# Patient Record
Sex: MALE | Race: ASIAN | Employment: FULL TIME | URBAN - METROPOLITAN AREA
[De-identification: names, ages, dates, MRNs, and addresses within clinical notes are randomized per-mention and may not be internally consistent; named-entity substitution may affect disease eponyms.]

---

## 2018-11-09 ENCOUNTER — OFFICE VISIT (OUTPATIENT)
Dept: FAMILY MEDICINE CLINIC | Age: 53
End: 2018-11-09
Payer: COMMERCIAL

## 2018-11-09 VITALS
OXYGEN SATURATION: 99 % | WEIGHT: 170 LBS | SYSTOLIC BLOOD PRESSURE: 90 MMHG | HEIGHT: 65 IN | BODY MASS INDEX: 28.32 KG/M2 | DIASTOLIC BLOOD PRESSURE: 60 MMHG | HEART RATE: 63 BPM

## 2018-11-09 DIAGNOSIS — Z00.00 ANNUAL PHYSICAL EXAM: Primary | ICD-10-CM

## 2018-11-09 DIAGNOSIS — Z23 NEED FOR INFLUENZA VACCINATION: ICD-10-CM

## 2018-11-09 DIAGNOSIS — Z00.00 ANNUAL PHYSICAL EXAM: ICD-10-CM

## 2018-11-09 DIAGNOSIS — Z12.11 COLON CANCER SCREENING: ICD-10-CM

## 2018-11-09 DIAGNOSIS — H61.22 IMPACTED CERUMEN, LEFT EAR: ICD-10-CM

## 2018-11-09 DIAGNOSIS — R07.89 CHEST PRESSURE: ICD-10-CM

## 2018-11-09 LAB
A/G RATIO: 1.8 (ref 1.1–2.2)
ALBUMIN SERPL-MCNC: 4.4 G/DL (ref 3.4–5)
ALP BLD-CCNC: 82 U/L (ref 40–129)
ALT SERPL-CCNC: 23 U/L (ref 10–40)
ANION GAP SERPL CALCULATED.3IONS-SCNC: 8 MMOL/L (ref 3–16)
AST SERPL-CCNC: 20 U/L (ref 15–37)
BASOPHILS ABSOLUTE: 0.1 K/UL (ref 0–0.2)
BASOPHILS RELATIVE PERCENT: 0.9 %
BILIRUB SERPL-MCNC: 0.7 MG/DL (ref 0–1)
BUN BLDV-MCNC: 9 MG/DL (ref 7–20)
CALCIUM SERPL-MCNC: 9.8 MG/DL (ref 8.3–10.6)
CHLORIDE BLD-SCNC: 102 MMOL/L (ref 99–110)
CHOLESTEROL, FASTING: 168 MG/DL (ref 0–199)
CO2: 29 MMOL/L (ref 21–32)
CREAT SERPL-MCNC: 1 MG/DL (ref 0.9–1.3)
EOSINOPHILS ABSOLUTE: 0.3 K/UL (ref 0–0.6)
EOSINOPHILS RELATIVE PERCENT: 4.8 %
GFR AFRICAN AMERICAN: >60
GFR NON-AFRICAN AMERICAN: >60
GLOBULIN: 2.4 G/DL
GLUCOSE BLD-MCNC: 85 MG/DL (ref 70–99)
HCT VFR BLD CALC: 48.9 % (ref 40.5–52.5)
HDLC SERPL-MCNC: 33 MG/DL (ref 40–60)
HEMOGLOBIN: 16.5 G/DL (ref 13.5–17.5)
LDL CHOLESTEROL CALCULATED: 101 MG/DL
LYMPHOCYTES ABSOLUTE: 2.4 K/UL (ref 1–5.1)
LYMPHOCYTES RELATIVE PERCENT: 39.9 %
MCH RBC QN AUTO: 30 PG (ref 26–34)
MCHC RBC AUTO-ENTMCNC: 33.7 G/DL (ref 31–36)
MCV RBC AUTO: 88.9 FL (ref 80–100)
MONOCYTES ABSOLUTE: 0.5 K/UL (ref 0–1.3)
MONOCYTES RELATIVE PERCENT: 8 %
NEUTROPHILS ABSOLUTE: 2.8 K/UL (ref 1.7–7.7)
NEUTROPHILS RELATIVE PERCENT: 46.4 %
PDW BLD-RTO: 13.2 % (ref 12.4–15.4)
PLATELET # BLD: 206 K/UL (ref 135–450)
PMV BLD AUTO: 10.4 FL (ref 5–10.5)
POTASSIUM SERPL-SCNC: 5 MMOL/L (ref 3.5–5.1)
RBC # BLD: 5.5 M/UL (ref 4.2–5.9)
SODIUM BLD-SCNC: 139 MMOL/L (ref 136–145)
TOTAL PROTEIN: 6.8 G/DL (ref 6.4–8.2)
TRIGLYCERIDE, FASTING: 169 MG/DL (ref 0–150)
TSH REFLEX: 2.46 UIU/ML (ref 0.27–4.2)
VLDLC SERPL CALC-MCNC: 34 MG/DL
WBC # BLD: 6 K/UL (ref 4–11)

## 2018-11-09 PROCEDURE — 93000 ELECTROCARDIOGRAM COMPLETE: CPT | Performed by: FAMILY MEDICINE

## 2018-11-09 PROCEDURE — 99386 PREV VISIT NEW AGE 40-64: CPT | Performed by: FAMILY MEDICINE

## 2018-11-09 PROCEDURE — 69209 REMOVE IMPACTED EAR WAX UNI: CPT | Performed by: FAMILY MEDICINE

## 2018-11-09 PROCEDURE — 90471 IMMUNIZATION ADMIN: CPT | Performed by: FAMILY MEDICINE

## 2018-11-09 PROCEDURE — 90682 RIV4 VACC RECOMBINANT DNA IM: CPT | Performed by: FAMILY MEDICINE

## 2018-11-09 PROCEDURE — 93017 CV STRESS TEST TRACING ONLY: CPT | Performed by: FAMILY MEDICINE

## 2018-11-09 ASSESSMENT — PATIENT HEALTH QUESTIONNAIRE - PHQ9
SUM OF ALL RESPONSES TO PHQ QUESTIONS 1-9: 0
2. FEELING DOWN, DEPRESSED OR HOPELESS: 0
1. LITTLE INTEREST OR PLEASURE IN DOING THINGS: 0
SUM OF ALL RESPONSES TO PHQ9 QUESTIONS 1 & 2: 0
SUM OF ALL RESPONSES TO PHQ QUESTIONS 1-9: 0

## 2018-11-09 NOTE — PROGRESS NOTES
[address] Note    Date: 11/9/2018                                               Subjective/Objective:     Chief Complaint   Patient presents with    Annual Exam      wants ECG and stress test to be done. .. Been fasting       HPI   Pt here to establish care. Last tdap 2017. Wants flu shot. Has never had a c-scope. Pt has complaint of L sided chest pressure. Lasts 5-10 minutes. Started about a year ago. Occurs about 5-6 times per month. No SOB. No dizziness. No vomiting. There are no active problems to display for this patient. History reviewed. No pertinent past medical history. Current Outpatient Prescriptions   Medication Sig Dispense Refill    carbamide peroxide (AURO EARDROPS) 6.5 % otic solution Place 5 drops in ear(s) 2 times daily 14.8 mL 0     No current facility-administered medications for this visit. No Known Allergies    Review of Systems   No rash, no bruise, no HA, no vision change, no ankle swelling, no hearing problems, no LAD      Vitals:  BP 90/60   Pulse 63   Ht 5' 5\" (1.651 m)   Wt 170 lb (77.1 kg)   SpO2 99%   BMI 28.29 kg/m²     Physical Exam   General:  Well-appearing, NAD, alert, non-toxic  HEENT:  Normocephalic, atraumatic. Pupils equal and round. Right TM normal.  +Left TM obscured with dried cerumen. Moist mucous membranes. Normal dentition  NECK:  Supple, normal range of motion, no LAD, no meningeal signs, no JVD, nontender  CHEST/LUNGS: CTAB, no crackles, no wheeze, no rhonchi. Symmetric rise  CARDIOVASCULAR: RRR,  no murmur, no rub  ABDOMEN: Soft, non-tender, non-distended. No masses  EXTREMETIES: Normal movement of all extremities. No edema. No joint swelling.   SKIN:  No rash, no cellulitis, no bruising, no petechiae/purpura/vesicles/pustules/abscess  PSYCH:  A+O x 3; normal affect  NEURO:  GCS 15, CN2-12 grossly intact, no focal motor/sensory deficits, no cerebellar deficits, normal gait, normal speech      Assessment/Plan

## 2018-11-10 LAB
ESTIMATED AVERAGE GLUCOSE: 93.9 MG/DL
HBA1C MFR BLD: 4.9 %

## 2019-02-01 ENCOUNTER — ANESTHESIA EVENT (OUTPATIENT)
Dept: ENDOSCOPY | Age: 54
End: 2019-02-01
Payer: COMMERCIAL

## 2019-02-08 ENCOUNTER — HOSPITAL ENCOUNTER (OUTPATIENT)
Age: 54
Setting detail: OUTPATIENT SURGERY
Discharge: HOME | End: 2019-02-08
Attending: INTERNAL MEDICINE | Admitting: INTERNAL MEDICINE
Payer: COMMERCIAL

## 2019-02-08 ENCOUNTER — ANESTHESIA (OUTPATIENT)
Dept: ENDOSCOPY | Age: 54
End: 2019-02-08
Payer: COMMERCIAL

## 2019-02-08 VITALS
SYSTOLIC BLOOD PRESSURE: 117 MMHG | HEIGHT: 65 IN | DIASTOLIC BLOOD PRESSURE: 85 MMHG | OXYGEN SATURATION: 100 % | TEMPERATURE: 97.8 F | WEIGHT: 168 LBS | HEART RATE: 66 BPM | BODY MASS INDEX: 27.99 KG/M2 | RESPIRATION RATE: 18 BRPM

## 2019-02-08 VITALS — DIASTOLIC BLOOD PRESSURE: 66 MMHG | SYSTOLIC BLOOD PRESSURE: 95 MMHG | OXYGEN SATURATION: 98 %

## 2019-02-08 PROCEDURE — 3700000001 HC ADD 15 MINUTES (ANESTHESIA): Performed by: INTERNAL MEDICINE

## 2019-02-08 PROCEDURE — 7100000010 HC PHASE II RECOVERY - FIRST 15 MIN: Performed by: INTERNAL MEDICINE

## 2019-02-08 PROCEDURE — 3609027000 HC COLONOSCOPY: Performed by: INTERNAL MEDICINE

## 2019-02-08 PROCEDURE — 2500000003 HC RX 250 WO HCPCS: Performed by: NURSE ANESTHETIST, CERTIFIED REGISTERED

## 2019-02-08 PROCEDURE — 6360000002 HC RX W HCPCS: Performed by: NURSE ANESTHETIST, CERTIFIED REGISTERED

## 2019-02-08 PROCEDURE — 6370000000 HC RX 637 (ALT 250 FOR IP): Performed by: INTERNAL MEDICINE

## 2019-02-08 PROCEDURE — 3700000000 HC ANESTHESIA ATTENDED CARE: Performed by: INTERNAL MEDICINE

## 2019-02-08 PROCEDURE — 2500000003 HC RX 250 WO HCPCS: Performed by: INTERNAL MEDICINE

## 2019-02-08 PROCEDURE — 2580000003 HC RX 258: Performed by: ANESTHESIOLOGY

## 2019-02-08 PROCEDURE — 7100000011 HC PHASE II RECOVERY - ADDTL 15 MIN: Performed by: INTERNAL MEDICINE

## 2019-02-08 PROCEDURE — 2709999900 HC NON-CHARGEABLE SUPPLY: Performed by: INTERNAL MEDICINE

## 2019-02-08 RX ORDER — SODIUM CHLORIDE 9 MG/ML
INJECTION, SOLUTION INTRAVENOUS CONTINUOUS
Status: DISCONTINUED | OUTPATIENT
Start: 2019-02-08 | End: 2019-02-08 | Stop reason: HOSPADM

## 2019-02-08 RX ORDER — LIDOCAINE HYDROCHLORIDE 20 MG/ML
INJECTION, SOLUTION INFILTRATION; PERINEURAL PRN
Status: DISCONTINUED | OUTPATIENT
Start: 2019-02-08 | End: 2019-02-08 | Stop reason: SDUPTHER

## 2019-02-08 RX ORDER — SODIUM CHLORIDE 0.9 % (FLUSH) 0.9 %
10 SYRINGE (ML) INJECTION EVERY 12 HOURS SCHEDULED
Status: DISCONTINUED | OUTPATIENT
Start: 2019-02-08 | End: 2019-02-08 | Stop reason: HOSPADM

## 2019-02-08 RX ORDER — LIDOCAINE HYDROCHLORIDE 10 MG/ML
1 INJECTION, SOLUTION EPIDURAL; INFILTRATION; INTRACAUDAL; PERINEURAL
Status: DISCONTINUED | OUTPATIENT
Start: 2019-02-08 | End: 2019-02-08 | Stop reason: HOSPADM

## 2019-02-08 RX ORDER — PROPOFOL 10 MG/ML
INJECTION, EMULSION INTRAVENOUS PRN
Status: DISCONTINUED | OUTPATIENT
Start: 2019-02-08 | End: 2019-02-08 | Stop reason: SDUPTHER

## 2019-02-08 RX ORDER — SODIUM CHLORIDE 0.9 % (FLUSH) 0.9 %
10 SYRINGE (ML) INJECTION PRN
Status: DISCONTINUED | OUTPATIENT
Start: 2019-02-08 | End: 2019-02-08 | Stop reason: HOSPADM

## 2019-02-08 RX ADMIN — LIDOCAINE HYDROCHLORIDE 30 MG: 20 INJECTION, SOLUTION INFILTRATION; PERINEURAL at 10:40

## 2019-02-08 RX ADMIN — SODIUM CHLORIDE: 9 INJECTION, SOLUTION INTRAVENOUS at 09:02

## 2019-02-08 RX ADMIN — LIDOCAINE HYDROCHLORIDE 20 MG: 20 INJECTION, SOLUTION INFILTRATION; PERINEURAL at 10:38

## 2019-02-08 RX ADMIN — LIDOCAINE HYDROCHLORIDE 10 MG: 20 INJECTION, SOLUTION INFILTRATION; PERINEURAL at 10:37

## 2019-02-08 RX ADMIN — LIDOCAINE HYDROCHLORIDE 80 MG: 20 INJECTION, SOLUTION INFILTRATION; PERINEURAL at 10:33

## 2019-02-08 RX ADMIN — LIDOCAINE HYDROCHLORIDE 20 MG: 20 INJECTION, SOLUTION INFILTRATION; PERINEURAL at 10:35

## 2019-02-08 RX ADMIN — PROPOFOL 100 MG: 10 INJECTION, EMULSION INTRAVENOUS at 10:33

## 2019-02-08 ASSESSMENT — PAIN SCALES - GENERAL
PAINLEVEL_OUTOF10: 0

## 2019-02-08 ASSESSMENT — PAIN - FUNCTIONAL ASSESSMENT: PAIN_FUNCTIONAL_ASSESSMENT: 0-10

## 2019-09-06 ENCOUNTER — TELEPHONE (OUTPATIENT)
Dept: FAMILY MEDICINE CLINIC | Age: 54
End: 2019-09-06

## 2019-09-06 DIAGNOSIS — Z00.00 ANNUAL PHYSICAL EXAM: Primary | ICD-10-CM

## 2019-09-06 NOTE — TELEPHONE ENCOUNTER
Patient requesting blood work for physical scheduled on 9/12/2019 at 10:00am. Advised pt that most insurance companies require 365 days for coverage on physicals and pt stated he needs for work, so pt has been advised.

## 2019-09-24 DIAGNOSIS — Z00.00 ANNUAL PHYSICAL EXAM: ICD-10-CM

## 2019-09-24 LAB
A/G RATIO: 2.1 (ref 1.1–2.2)
ALBUMIN SERPL-MCNC: 4.6 G/DL (ref 3.4–5)
ALP BLD-CCNC: 80 U/L (ref 40–129)
ALT SERPL-CCNC: 20 U/L (ref 10–40)
ANION GAP SERPL CALCULATED.3IONS-SCNC: 15 MMOL/L (ref 3–16)
AST SERPL-CCNC: 20 U/L (ref 15–37)
BILIRUB SERPL-MCNC: 0.8 MG/DL (ref 0–1)
BUN BLDV-MCNC: 10 MG/DL (ref 7–20)
CALCIUM SERPL-MCNC: 9.6 MG/DL (ref 8.3–10.6)
CHLORIDE BLD-SCNC: 103 MMOL/L (ref 99–110)
CHOLESTEROL, TOTAL: 160 MG/DL (ref 0–199)
CO2: 26 MMOL/L (ref 21–32)
CREAT SERPL-MCNC: 0.9 MG/DL (ref 0.9–1.3)
GFR AFRICAN AMERICAN: >60
GFR NON-AFRICAN AMERICAN: >60
GLOBULIN: 2.2 G/DL
GLUCOSE BLD-MCNC: 89 MG/DL (ref 70–99)
HCT VFR BLD CALC: 46.4 % (ref 40.5–52.5)
HDLC SERPL-MCNC: 38 MG/DL (ref 40–60)
HEMOGLOBIN: 15.7 G/DL (ref 13.5–17.5)
LDL CHOLESTEROL CALCULATED: 99 MG/DL
MCH RBC QN AUTO: 30.2 PG (ref 26–34)
MCHC RBC AUTO-ENTMCNC: 33.8 G/DL (ref 31–36)
MCV RBC AUTO: 89.2 FL (ref 80–100)
PDW BLD-RTO: 13.1 % (ref 12.4–15.4)
PLATELET # BLD: 199 K/UL (ref 135–450)
PMV BLD AUTO: 10.1 FL (ref 5–10.5)
POTASSIUM SERPL-SCNC: 4.7 MMOL/L (ref 3.5–5.1)
RBC # BLD: 5.21 M/UL (ref 4.2–5.9)
SODIUM BLD-SCNC: 144 MMOL/L (ref 136–145)
TOTAL PROTEIN: 6.8 G/DL (ref 6.4–8.2)
TRIGL SERPL-MCNC: 115 MG/DL (ref 0–150)
TSH REFLEX: 2.84 UIU/ML (ref 0.27–4.2)
VLDLC SERPL CALC-MCNC: 23 MG/DL
WBC # BLD: 5.6 K/UL (ref 4–11)

## 2019-09-25 ENCOUNTER — OFFICE VISIT (OUTPATIENT)
Dept: FAMILY MEDICINE CLINIC | Age: 54
End: 2019-09-25
Payer: COMMERCIAL

## 2019-09-25 VITALS
SYSTOLIC BLOOD PRESSURE: 118 MMHG | OXYGEN SATURATION: 98 % | DIASTOLIC BLOOD PRESSURE: 70 MMHG | HEART RATE: 59 BPM | WEIGHT: 166 LBS | BODY MASS INDEX: 27.62 KG/M2

## 2019-09-25 DIAGNOSIS — Z00.00 ANNUAL PHYSICAL EXAM: Primary | ICD-10-CM

## 2019-09-25 DIAGNOSIS — Z23 NEED FOR VACCINATION: ICD-10-CM

## 2019-09-25 LAB
BILIRUBIN, POC: NEGATIVE
BLOOD URINE, POC: NEGATIVE
CLARITY, POC: CLEAR
COLOR, POC: YELLOW
ESTIMATED AVERAGE GLUCOSE: 88.2 MG/DL
GLUCOSE URINE, POC: NEGATIVE
HBA1C MFR BLD: 4.7 %
KETONES, POC: NEGATIVE
LEUKOCYTE EST, POC: NEGATIVE
NITRITE, POC: NEGATIVE
PH, POC: 7
PROTEIN, POC: NEGATIVE
SPECIFIC GRAVITY, POC: 1.01
UROBILINOGEN, POC: 0.2

## 2019-09-25 PROCEDURE — 99396 PREV VISIT EST AGE 40-64: CPT | Performed by: FAMILY MEDICINE

## 2019-09-25 PROCEDURE — 90686 IIV4 VACC NO PRSV 0.5 ML IM: CPT | Performed by: FAMILY MEDICINE

## 2019-09-25 PROCEDURE — 81002 URINALYSIS NONAUTO W/O SCOPE: CPT | Performed by: FAMILY MEDICINE

## 2019-09-25 PROCEDURE — 90471 IMMUNIZATION ADMIN: CPT | Performed by: FAMILY MEDICINE

## 2019-09-25 ASSESSMENT — PATIENT HEALTH QUESTIONNAIRE - PHQ9
SUM OF ALL RESPONSES TO PHQ9 QUESTIONS 1 & 2: 0
SUM OF ALL RESPONSES TO PHQ QUESTIONS 1-9: 0
SUM OF ALL RESPONSES TO PHQ QUESTIONS 1-9: 0
1. LITTLE INTEREST OR PLEASURE IN DOING THINGS: 0
2. FEELING DOWN, DEPRESSED OR HOPELESS: 0

## 2019-09-25 NOTE — PROGRESS NOTES
[address] Note    Date: 9/25/2019                                               Subjective/Objective:     Chief Complaint   Patient presents with    Annual Exam       HPI   Pt here for annual exam.  Last tdap 2017. Wants flu shot. Last c-scope earlier this year, was told to f/u in 10 years. Pt feels well and has no complaints. There are no active problems to display for this patient. Past Medical History:   Diagnosis Date    Hernia, abdominal        No current outpatient medications on file. No current facility-administered medications for this visit. No Known Allergies    Review of Systems   No CP, no SOB, no rash, no bruise, no HA, no vision change, no ankle swelling, no hearing problems, no LAD      Vitals:  /70   Pulse 59   Wt 166 lb (75.3 kg)   SpO2 98%   BMI 27.62 kg/m²     Physical Exam   General:  Well-appearing, NAD, alert, non-toxic  HEENT:  Normocephalic, atraumatic. Pupils equal and round. TMs pearly with good landmarks. Moist mucous membranes. Normal dentition  NECK:  Supple, normal range of motion, no LAD, no meningeal signs, no JVD, nontender  CHEST/LUNGS: CTAB, no crackles, no wheeze, no rhonchi. Symmetric rise  CARDIOVASCULAR: RRR,  no murmur, no rub  ABDOMEN: Soft, non-tender, non-distended. No masses  EXTREMETIES: Normal movement of all extremities. No edema. No joint swelling. SKIN:  No rash, no cellulitis, no bruising, no petechiae/purpura/vesicles/pustules/abscess  PSYCH:  A+O x 3; normal affect  NEURO:  GCS 15, CN2-12 grossly intact, no focal motor/sensory deficits, no cerebellar deficits, normal gait, normal speech      Assessment/Plan     61-year-old male here for annual exam.  Tdap is up-to-date. Will update flu today. Colonoscopy is up-to-date. Labs are up-to-date. Vital signs are stable. Overall patient is very healthy. Discussed healthy diet and exercise and dental hygiene.     Follow-up in 1 year or as needed. Discharge in stable condition at 5:53 PM.    1. Annual physical exam         No orders of the defined types were placed in this encounter. No follow-ups on file.     Layo Daily MD    9/25/2019  5:46 PM

## 2021-08-06 ENCOUNTER — OFFICE VISIT (OUTPATIENT)
Dept: FAMILY MEDICINE CLINIC | Age: 56
End: 2021-08-06
Payer: COMMERCIAL

## 2021-08-06 VITALS
OXYGEN SATURATION: 98 % | WEIGHT: 162 LBS | SYSTOLIC BLOOD PRESSURE: 110 MMHG | HEIGHT: 65 IN | DIASTOLIC BLOOD PRESSURE: 70 MMHG | HEART RATE: 55 BPM | BODY MASS INDEX: 26.99 KG/M2

## 2021-08-06 DIAGNOSIS — R21 RASH: ICD-10-CM

## 2021-08-06 DIAGNOSIS — Z23 NEED FOR VACCINATION: ICD-10-CM

## 2021-08-06 DIAGNOSIS — Z00.00 ANNUAL PHYSICAL EXAM: Primary | ICD-10-CM

## 2021-08-06 PROCEDURE — 90750 HZV VACC RECOMBINANT IM: CPT | Performed by: FAMILY MEDICINE

## 2021-08-06 PROCEDURE — 90471 IMMUNIZATION ADMIN: CPT | Performed by: FAMILY MEDICINE

## 2021-08-06 PROCEDURE — 99396 PREV VISIT EST AGE 40-64: CPT | Performed by: FAMILY MEDICINE

## 2021-08-06 RX ORDER — CLOBETASOL PROPIONATE 0.5 MG/G
CREAM TOPICAL
Qty: 30 G | Refills: 0 | Status: SHIPPED | OUTPATIENT
Start: 2021-08-06 | End: 2022-04-21

## 2021-08-06 SDOH — ECONOMIC STABILITY: FOOD INSECURITY: WITHIN THE PAST 12 MONTHS, YOU WORRIED THAT YOUR FOOD WOULD RUN OUT BEFORE YOU GOT MONEY TO BUY MORE.: NEVER TRUE

## 2021-08-06 SDOH — ECONOMIC STABILITY: FOOD INSECURITY: WITHIN THE PAST 12 MONTHS, THE FOOD YOU BOUGHT JUST DIDN'T LAST AND YOU DIDN'T HAVE MONEY TO GET MORE.: NEVER TRUE

## 2021-08-06 ASSESSMENT — PATIENT HEALTH QUESTIONNAIRE - PHQ9
SUM OF ALL RESPONSES TO PHQ QUESTIONS 1-9: 0
SUM OF ALL RESPONSES TO PHQ QUESTIONS 1-9: 0
2. FEELING DOWN, DEPRESSED OR HOPELESS: 0
1. LITTLE INTEREST OR PLEASURE IN DOING THINGS: 0
SUM OF ALL RESPONSES TO PHQ9 QUESTIONS 1 & 2: 0
SUM OF ALL RESPONSES TO PHQ QUESTIONS 1-9: 0

## 2021-08-06 ASSESSMENT — SOCIAL DETERMINANTS OF HEALTH (SDOH): HOW HARD IS IT FOR YOU TO PAY FOR THE VERY BASICS LIKE FOOD, HOUSING, MEDICAL CARE, AND HEATING?: NOT HARD AT ALL

## 2021-08-06 NOTE — PROGRESS NOTES
[address] Note    Date: 8/6/2021                                               Subjective/Objective:   No chief complaint on file. HPI   Patient is here for annual exam.  Last Tdap 2017. Last colonoscopy 2 years ago, was told to follow-up in 10 years. Currently takes no medicines. +itchy rash on L buttock starting 6 months ago. Stable in severity. No drainage. No pain       There are no problems to display for this patient. Past Medical History:   Diagnosis Date    Hernia, abdominal        Current Outpatient Medications   Medication Sig Dispense Refill    clobetasol (TEMOVATE) 0.05 % cream Apply topically 2 times daily. 30 g 0     No current facility-administered medications for this visit. No Known Allergies    Review of Systems   No CP, no SOB, no bruise, no HA, no vision change, no ankle swelling, no hearing problems, no LAD      Vitals:  /70   Pulse 55   Ht 5' 5\" (1.651 m)   Wt 162 lb (73.5 kg)   SpO2 98%   BMI 26.96 kg/m²     Physical Exam   General:  Well-appearing, NAD, alert, non-toxic  HEENT:  Normocephalic, atraumatic. Pupils equal and round. TMs pearly with good landmarks. Moist mucous membranes. Normal dentition  NECK:  Supple, normal range of motion, no LAD, no meningeal signs, no JVD, nontender  CHEST/LUNGS: CTAB, no crackles, no wheeze, no rhonchi. Symmetric rise  CARDIOVASCULAR: RRR,  no murmur, no rub  ABDOMEN: Soft, non-tender, non-distended. No masses  EXTREMETIES: Normal movement of all extremities. No edema. No joint swelling. SKIN:  +hyperpigmented scaly maculopapular lesion over L hip/ upper buttock  PSYCH:  A+O x 3; normal affect  NEURO:  GCS 15, CN2-12 grossly intact, no focal motor/sensory deficits, no cerebellar deficits, normal gait, normal speech      Assessment/Plan     60-year-old male here for annual exam.  Colonoscopy is up-to-date. Check routine labs. Vital signs are stable.     Patient has itchy rash on left hip/upper buttock. Likely dermatitis. Will treat with topical clobetasol. Discussed with patient medication/s dosage, instructions, potential S/E, A/R and Drug Interaction  Tylenol or Motrin as needed for pain or fever  Advise to return here if worse or go to nearest ER  Encourage fluids  Pt discharged in stable condition. 1. Annual physical exam    - CBC Auto Differential; Future  - Comprehensive Metabolic Panel; Future  - Hemoglobin A1C; Future  - Hepatitis C Antibody; Future  - HIV Screen; Future  - Lipid, Fasting; Future  - TSH with Reflex; Future    2. Rash    - clobetasol (TEMOVATE) 0.05 % cream; Apply topically 2 times daily. Dispense: 30 g; Refill: 0    3. Need for vaccination    - Zoster [facility])         Orders Placed This Encounter   Procedures    Zoster [facility])    CBC Auto Differential     Standing Status:   Future     Number of Occurrences:   1     Standing Expiration Date:   8/6/2022    Comprehensive Metabolic Panel     Standing Status:   Future     Number of Occurrences:   1     Standing Expiration Date:   8/6/2022    Hemoglobin A1C     Standing Status:   Future     Number of Occurrences:   1     Standing Expiration Date:   8/6/2022    Hepatitis C Antibody     Standing Status:   Future     Number of Occurrences:   1     Standing Expiration Date:   8/6/2022    HIV Screen     Standing Status:   Future     Number of Occurrences:   1     Standing Expiration Date:   8/6/2022    Lipid, Fasting     Standing Status:   Future     Number of Occurrences:   1     Standing Expiration Date:   8/6/2022    TSH with Reflex     Standing Status:   Future     Number of Occurrences:   1     Standing Expiration Date:   8/6/2022       No follow-ups on file.     Deanne Olivo MD, MD    8/6/2021  11:02 AM

## 2021-08-13 ENCOUNTER — TELEPHONE (OUTPATIENT)
Dept: FAMILY MEDICINE CLINIC | Age: 56
End: 2021-08-13

## 2021-08-13 NOTE — TELEPHONE ENCOUNTER
Called and spoke with pt about paperwork. Pt states that he would like it mailed to his home.   I informed pt that I would get it ready to be mailed out to him

## 2021-08-13 NOTE — TELEPHONE ENCOUNTER
Please advise if we should try to e-mail paper again, if we should mail to the address listed, or if pt should come and  paper.

## 2021-08-13 NOTE — TELEPHONE ENCOUNTER
It should have been emailed a few days ago. He should let us know if it has not been receive by next week.

## 2022-03-11 ENCOUNTER — OFFICE VISIT (OUTPATIENT)
Dept: FAMILY MEDICINE CLINIC | Age: 57
End: 2022-03-11
Payer: COMMERCIAL

## 2022-03-11 VITALS
OXYGEN SATURATION: 98 % | WEIGHT: 161 LBS | HEART RATE: 77 BPM | SYSTOLIC BLOOD PRESSURE: 110 MMHG | BODY MASS INDEX: 26.79 KG/M2 | DIASTOLIC BLOOD PRESSURE: 80 MMHG

## 2022-03-11 DIAGNOSIS — K43.2 INCISIONAL HERNIA, WITHOUT OBSTRUCTION OR GANGRENE: ICD-10-CM

## 2022-03-11 DIAGNOSIS — Z23 NEED FOR VACCINATION: Primary | ICD-10-CM

## 2022-03-11 DIAGNOSIS — Z00.00 ANNUAL PHYSICAL EXAM: ICD-10-CM

## 2022-03-11 PROCEDURE — 99213 OFFICE O/P EST LOW 20 MIN: CPT | Performed by: FAMILY MEDICINE

## 2022-03-11 PROCEDURE — 90471 IMMUNIZATION ADMIN: CPT | Performed by: FAMILY MEDICINE

## 2022-03-11 PROCEDURE — 90750 HZV VACC RECOMBINANT IM: CPT | Performed by: FAMILY MEDICINE

## 2022-03-11 NOTE — PROGRESS NOTES
[address] [facility] Note    Date: 3/11/2022                                               Giorgio Hutson:     Chief Complaint   Patient presents with    Hernia       HPI   Pt here with hernia of R lower abdomen. Near the vertical incision where he had an appendectomy 30 years ago. Is generally not painful, but gets sore at times. Stable in size recently. There are no problems to display for this patient. Past Medical History:   Diagnosis Date    Hernia, abdominal        Current Outpatient Medications   Medication Sig Dispense Refill    clobetasol (TEMOVATE) 0.05 % cream Apply topically 2 times daily. 30 g 0     No current facility-administered medications for this visit. No Known Allergies    Review of Systems   No fever, no cough    Vitals:  /80   Pulse 77   Wt 161 lb (73 kg)   SpO2 98%   BMI 26.79 kg/m²     Wt Readings from Last 3 Encounters:   03/11/22 161 lb (73 kg)   08/06/21 162 lb (73.5 kg)   09/25/19 166 lb (75.3 kg)        Physical Exam   General:  Well-appearing, NAD, alert, non-toxic  HEENT:  Normocephalic, atraumatic. CHEST/LUNGS: No dyspnea  EXTREMETIES: Normal movement of all extremities. No edema. No joint swelling. SKIN:  No rash, no cellulitis, no bruising, no petechiae/purpura/vesicles/pustules/abscess  GI:  +8cm bulge in RLQ just lateral to a vertical healed incision that is lateral to midline  NEURO:  GCS 15, CN2-12 grossly intact, no focal motor/sensory deficits, normal gait, normal speech      Assessment/Plan     69-year-old male with end-stage incisional hernia of the right lower quadrant. Is likely secondary to his appendectomy from many years ago. May need surgical repair. Will refer to general surgery for further evaluation treatment. Update Shingrix vaccine today.     Discussed with patient medication/s dosage, instructions, potential S/E, A/R and Drug Interaction  Tylenol or Motrin as needed for pain or fever  Advise to return here if worse or go to nearest ER  Encourage fluids  Pt discharged in stable condition at 10:55      1. Need for vaccination  -     Zoster recombinant [facility])  2. Incisional hernia, without obstruction or gangrene  -     Kay Lane MD, General Surgery, [facility]  3. Annual physical exam  -     CBC with Auto Differential; Future  -     Comprehensive Metabolic Panel; Future  -     Hemoglobin A1C; Future  -     Lipid, Fasting; Future  -     TSH with Reflex; Future       Orders Placed This Encounter   Procedures    Zoster recombinant [facility])    CBC with Auto Differential     Standing Status:   Future     Standing Expiration Date:   3/11/2023    Comprehensive Metabolic Panel     Standing Status:   Future     Standing Expiration Date:   3/11/2023    Hemoglobin A1C     Standing Status:   Future     Standing Expiration Date:   3/11/2023    Lipid, Fasting     Standing Status:   Future     Standing Expiration Date:   3/11/2023    TSH with Reflex     Standing Status:   Future     Standing Expiration Date:   3/11/2023   Kay Lane MD, General Surgery, [facility]     Referral Priority:   Routine     Referral Type:   Eval and Treat     Referral Reason:   Specialty Services Required     Referred to Provider:   Catherine Mcmahon MD     Requested Specialty:   General Surgery     Number of Visits Requested:   1       No follow-ups on file.     Gosia Martinez MD, MD    3/11/2022  10:54 AM

## 2022-03-22 ENCOUNTER — OFFICE VISIT (OUTPATIENT)
Dept: SURGERY | Age: 57
End: 2022-03-22
Payer: COMMERCIAL

## 2022-03-22 VITALS
HEIGHT: 65 IN | DIASTOLIC BLOOD PRESSURE: 70 MMHG | WEIGHT: 162 LBS | BODY MASS INDEX: 26.99 KG/M2 | SYSTOLIC BLOOD PRESSURE: 120 MMHG

## 2022-03-22 DIAGNOSIS — K43.2 INCISIONAL HERNIA, WITHOUT OBSTRUCTION OR GANGRENE: ICD-10-CM

## 2022-03-22 PROCEDURE — 99203 OFFICE O/P NEW LOW 30 MIN: CPT | Performed by: SURGERY

## 2022-03-22 ASSESSMENT — ENCOUNTER SYMPTOMS
APNEA: 0
COLOR CHANGE: 0
ABDOMINAL PAIN: 1
EYE ITCHING: 0
ABDOMINAL DISTENTION: 0
CHEST TIGHTNESS: 0
BACK PAIN: 0
EYE DISCHARGE: 0

## 2022-03-22 NOTE — PATIENT INSTRUCTIONS
1.  Right lower quadrant incisional hernia would benefit from repair but first need to define anatomy to help guide surgery. Will order CT of abdomen pelvis to evaluate the incisional hernia as well as umbilical hernia  2. Warning signs of a strangulated hernia include increased and constant pain, nausea, vomiting, fevers, chills and constipation. This is a surgical emergency and the patient should contact the office or present to an emergency department  3.   Return to office in approximately 2 weeks after the CT is to discuss results and further options, likely plan surgical repair

## 2022-03-22 NOTE — Clinical Note
Thank you for sending Mr. Early.  Incarcerated incisional hernia would benefit from repair but will first order CT to define anatomy and guide surgical options

## 2022-03-22 NOTE — PROGRESS NOTES
[facility]  SUBJECTIVE:    Chief Complaint: incarcerated incisional hernia    Jet Early   1965   65 y.o. male presents for evaluation of an incarcerated right lower quadrant incisional hernia. Approximately 30 years ago in [facility] the patient had an open appendectomy through a right paramedian incision. He does not recall details but mentions a wound infection and return to surgery shortly after the first surgery. This may have been from a fascial dehiscence or intra-abdominal abscess. The patient shortly after developed a hernia that has been incarcerated for many years but tender. Denies nausea vomiting fevers chills chest pain dyspnea change in bowels or other complaints. Discomfort is most pronounced with lifting and straining and exercise somewhat relieved with rest although the hernia does not reduce. This is not lifestyle limiting as the patient still exercises but he does want the hernia repaired    Review of Systems   Constitutional: Negative for activity change and appetite change. HENT: Negative for congestion and dental problem. Eyes: Negative for discharge and itching. Respiratory: Negative for apnea and chest tightness. Cardiovascular: Negative for chest pain and leg swelling. Gastrointestinal: Positive for abdominal pain. Negative for abdominal distention. Endocrine: Negative for cold intolerance and heat intolerance. Genitourinary: Negative for difficulty urinating and dysuria. Musculoskeletal: Negative for arthralgias and back pain. Skin: Negative for color change and pallor. Allergic/Immunologic: Negative for environmental allergies and food allergies. Neurological: Negative for dizziness and facial asymmetry. Hematological: Negative for adenopathy. Does not bruise/bleed easily. Psychiatric/Behavioral: Negative for agitation and behavioral problems.        Past Medical History:   Diagnosis Date    Hernia, abdominal      Past Surgical History:   Procedure Laterality Date    APPENDECTOMY      COLONOSCOPY N/A 2/8/2019    COLONOSCOPY performed by Denisa Russell MD at [address] History     Socioeconomic History    Marital status:      Spouse name: Not on file    Number of children: Not on file    Years of education: Not on file    Highest education level: Not on file   Occupational History    Not on file   Tobacco Use    Smoking status: Never Smoker    Smokeless tobacco: Never Used   Substance and Sexual Activity    Alcohol use: No    Drug use: No    Sexual activity: Not on file   Other Topics Concern    Not on file   Social History Narrative    Not on file     Social Determinants of Health     Financial Resource Strain: Low Risk     Difficulty of Paying Living Expenses: Not hard at all   Food Insecurity: No Food Insecurity    Worried About [address] in the Last Year: Never true    [address] in the Last Year: Never true   Transportation Needs:     Lack of Transportation (Medical): Not on file    Lack of Transportation (Non-Medical):  Not on file   Physical Activity:     Days of Exercise per Week: Not on file    Minutes of Exercise per Session: Not on file   Stress:     Feeling of Stress : Not on file   Social Connections:     Frequency of Communication with Friends and Family: Not on file    Frequency of Social Gatherings with Friends and Family: Not on file    Attends Jain Services: Not on file    Active Member of Clubs or Organizations: Not on file    Attends Club or Organization Meetings: Not on file    Marital Status: Not on file   Intimate Partner Violence:     Fear of Current or Ex-Partner: Not on file    Emotionally Abused: Not on file    Physically Abused: Not on file    Sexually Abused: Not on file   Housing Stability:     Unable to Pay for Housing in the Last Year: Not on file    Number of [place] in the Last Year: Not on file    Unstable Housing in the Last Year: Not on file      Family History   Problem Relation Age of Onset    Diabetes Mother     Cancer Paternal Aunt      Current Outpatient Medications   Medication Sig Dispense Refill    clobetasol (TEMOVATE) 0.05 % cream Apply topically 2 times daily. 30 g 0     No current facility-administered medications for this visit. No Known Allergies     OBJECTIVE:  /70   Ht 5' 5\" (1.651 m)   Wt 162 lb (73.5 kg)   BMI 26.96 kg/m²    Physical Exam  Constitutional:       General: He is not in acute distress. Appearance: He is well-developed. He is not diaphoretic. HENT:      Head: Normocephalic and atraumatic. Eyes:      Conjunctiva/sclera: Conjunctivae normal.      Pupils: Pupils are equal, round, and reactive to light. Neck:      Vascular: No JVD. Cardiovascular:      Rate and Rhythm: Normal rate and regular rhythm. Heart sounds: Normal heart sounds. Pulmonary:      Effort: Pulmonary effort is normal. No respiratory distress. Breath sounds: Normal breath sounds. No wheezing. Abdominal:      General: Bowel sounds are normal. There is no distension. Palpations: Abdomen is soft. There is no mass. Tenderness: There is no abdominal tenderness. There is no guarding. Musculoskeletal:      Cervical back: Normal range of motion and neck supple. Lymphadenopathy:      Cervical: No cervical adenopathy. Skin:     General: Skin is warm and dry. Findings: No erythema or rash. Neurological:      Mental Status: He is alert and oriented to person, place, and time. Psychiatric:         Behavior: Behavior normal.         Thought Content: Thought content normal.         Judgment: Judgment normal.          Labs:  No visits with results within 6 Week(s) from this visit.    Latest known visit with results is:   Orders Only on 08/06/2021   Component Date Value Ref Range Status    TSH 08/06/2021 2.51  0.27 - 4.20 uIU/mL Final    Cholesterol, Fasting 08/06/2021 180  0 - 199 mg/dL Final    Triglyceride, Fasting 08/06/2021 110  0 - 150 mg/dL Final    HDL 08/06/2021 35* 40 - 60 mg/dL Final    LDL Calculated 08/06/2021 123* <100 mg/dL Final    VLDL Cholesterol Calculated 08/06/2021 22  Not Established mg/dL Final    HIV Ag/Ab 08/06/2021 Non-Reactive  Non-reactive Final    HIV-1 Antibody 08/06/2021 Non-Reactive  Non-reactive Final    HIV ANTIGEN 08/06/2021 Non-Reactive  Non-reactive Final    HIV-2 Ab 08/06/2021 Non-Reactive  Non-reactive Final    Hep C Ab Interp 08/06/2021 Non-reactive  Non-reactive Final    Hemoglobin A1C 08/06/2021 4.8  See comment % Final    Comment: Comment:  Diagnosis of Diabetes: > or = 6.5%  Increased risk of diabetes (Prediabetes): 5.7-6.4%  Glycemic Control: Nonpregnant Adults: <7.0%                    Pregnant: <6.0%        eAG 08/06/2021 91.1  mg/dL Final    Sodium 08/06/2021 135* 136 - 145 mmol/L Final    Potassium 08/06/2021 4.9  3.5 - 5.1 mmol/L Final    Chloride 08/06/2021 101  99 - 110 mmol/L Final    CO2 08/06/2021 24  21 - 32 mmol/L Final    Anion Gap 08/06/2021 10  3 - 16 Final    Glucose 08/06/2021 82  70 - 99 mg/dL Final    BUN 08/06/2021 9  7 - 20 mg/dL Final    CREATININE 08/06/2021 0.8* 0.9 - 1.3 mg/dL Final    GFR Non- 08/06/2021 >60  >60 Final    Comment: >60 mL/min/1.73m2 EGFR, calc. for ages 25 and older using the  MDRD formula (not corrected for weight), is valid for stable  renal function.  GFR  08/06/2021 >60  >60 Final    Comment: Chronic Kidney Disease: less than 60 ml/min/1.73 sq.m. Kidney Failure: less than 15 ml/min/1.73 sq.m. Results valid for patients 18 years and older.       Calcium 08/06/2021 8.8  8.3 - 10.6 mg/dL Final    Total Protein 08/06/2021 6.8  6.4 - 8.2 g/dL Final    Albumin 08/06/2021 4.2  3.4 - 5.0 g/dL Final    Albumin/Globulin Ratio 08/06/2021 1.6  1.1 - 2.2 Final    Total Bilirubin 08/06/2021 0.7  0.0 - 1.0 mg/dL Final  Alkaline Phosphatase 08/06/2021 71  40 - 129 U/L Final    ALT 08/06/2021 11  10 - 40 U/L Final    AST 08/06/2021 22  15 - 37 U/L Final    Globulin 08/06/2021 2.6  g/dL Final    WBC 08/06/2021 6.7  4.0 - 11.0 K/uL Final    RBC 08/06/2021 5.02  4.20 - 5.90 M/uL Final    Hemoglobin 08/06/2021 15.1  13.5 - 17.5 g/dL Final    Hematocrit 08/06/2021 44.7  40.5 - 52.5 % Final    MCV 08/06/2021 89.0  80.0 - 100.0 fL Final    MCH 08/06/2021 30.0  26.0 - 34.0 pg Final    MCHC 08/06/2021 33.7  31.0 - 36.0 g/dL Final    RDW 08/06/2021 13.4  12.4 - 15.4 % Final    Platelets 27/20/9629 204  135 - 450 K/uL Final    MPV 08/06/2021 8.6  5.0 - 10.5 fL Final    Neutrophils % 08/06/2021 44.3  % Final    Lymphocytes % 08/06/2021 36.3  % Final    Monocytes % 08/06/2021 7.2  % Final    Eosinophils % 08/06/2021 11.1  % Final    Basophils % 08/06/2021 1.1  % Final    Neutrophils Absolute 08/06/2021 3.0  1.7 - 7.7 K/uL Final    Lymphocytes Absolute 08/06/2021 2.4  1.0 - 5.1 K/uL Final    Monocytes Absolute 08/06/2021 0.5  0.0 - 1.3 K/uL Final    Eosinophils Absolute 08/06/2021 0.7* 0.0 - 0.6 K/uL Final    Basophils Absolute 08/06/2021 0.1  0.0 - 0.2 K/uL Final       Imaging:  No results found. ASSESSMENT:  Incarcerated incisional hernia  Reducible umbilical hernia    PLAN:  1. Right lower quadrant incisional hernia would benefit from repair but first need to define anatomy to help guide surgery. Will order CT of abdomen pelvis to evaluate the incisional hernia as well as umbilical hernia  2. Warning signs of a strangulated hernia include increased and constant pain, nausea, vomiting, fevers, chills and constipation. This is a surgical emergency and the patient should contact the office or present to an emergency department  3. Return to office in approximately 2 weeks after the CT is to discuss results and further options, likely plan surgical repair    Tano Whitmore MD, FACS  3/22/2022  1:42 PM

## 2022-04-13 ENCOUNTER — HOSPITAL ENCOUNTER (OUTPATIENT)
Dept: CT IMAGING | Age: 57
Discharge: HOME | End: 2022-04-13
Payer: COMMERCIAL

## 2022-04-13 DIAGNOSIS — K43.2 INCISIONAL HERNIA, WITHOUT OBSTRUCTION OR GANGRENE: ICD-10-CM

## 2022-04-13 PROCEDURE — 6360000004 HC RX CONTRAST MEDICATION: Performed by: SURGERY

## 2022-04-13 PROCEDURE — 74177 CT ABD & PELVIS W/CONTRAST: CPT

## 2022-04-13 RX ADMIN — IOHEXOL 50 ML: 240 INJECTION, SOLUTION INTRATHECAL; INTRAVASCULAR; INTRAVENOUS; ORAL at 17:35

## 2022-04-13 RX ADMIN — IOPAMIDOL 80 ML: 755 INJECTION, SOLUTION INTRAVENOUS at 17:34

## 2022-04-20 ENCOUNTER — OFFICE VISIT (OUTPATIENT)
Dept: SURGERY | Age: 57
End: 2022-04-20
Payer: COMMERCIAL

## 2022-04-20 VITALS — DIASTOLIC BLOOD PRESSURE: 70 MMHG | WEIGHT: 162 LBS | SYSTOLIC BLOOD PRESSURE: 106 MMHG | BODY MASS INDEX: 26.96 KG/M2

## 2022-04-20 DIAGNOSIS — K43.2 INCISIONAL HERNIA, WITHOUT OBSTRUCTION OR GANGRENE: Primary | ICD-10-CM

## 2022-04-20 PROCEDURE — 99213 OFFICE O/P EST LOW 20 MIN: CPT | Performed by: SURGERY

## 2022-04-20 RX ORDER — SODIUM CHLORIDE 0.9 % (FLUSH) 0.9 %
5-40 SYRINGE (ML) INJECTION PRN
Status: CANCELLED | OUTPATIENT
Start: 2022-04-20

## 2022-04-20 RX ORDER — SODIUM CHLORIDE 9 MG/ML
INJECTION, SOLUTION INTRAVENOUS PRN
Status: CANCELLED | OUTPATIENT
Start: 2022-04-20

## 2022-04-20 RX ORDER — SODIUM CHLORIDE 0.9 % (FLUSH) 0.9 %
5-40 SYRINGE (ML) INJECTION EVERY 12 HOURS SCHEDULED
Status: CANCELLED | OUTPATIENT
Start: 2022-04-20

## 2022-04-20 NOTE — PROGRESS NOTES
No Food Insecurity    Worried About Running Out of Food in the Last Year: Never true    Marvin of Food in the Last Year: Never true   Transportation Needs:     Lack of Transportation (Medical): Not on file    Lack of Transportation (Non-Medical): Not on file   Physical Activity:     Days of Exercise per Week: Not on file    Minutes of Exercise per Session: Not on file   Stress:     Feeling of Stress : Not on file   Social Connections:     Frequency of Communication with Friends and Family: Not on file    Frequency of Social Gatherings with Friends and Family: Not on file    Attends [employer] Services: Not on file    Active Member of [address] [place] [address] [employer] or Organizations: Not on file    Attends Club or Organization Meetings: Not on file    Marital Status: Not on file   Intimate Partner Violence:     Fear of Current or Ex-Partner: Not on file    Emotionally Abused: Not on file    Physically Abused: Not on file    Sexually Abused: Not on file   Housing Stability:     Unable to Pay for Housing in the Last Year: Not on file    Number of [place] in the Last Year: Not on file    Unstable Housing in the Last Year: Not on file      Family History   Problem Relation Age of Onset    Diabetes Mother     Cancer Paternal Aunt      Current Outpatient Medications   Medication Sig Dispense Refill    clobetasol (TEMOVATE) 0.05 % cream Apply topically 2 times daily. 30 g 0     No current facility-administered medications for this visit.       No Known Allergies     Review of Systems:  Review of systems performed and negative with the exception of the above findings    OBJECTIVE:  /70   Wt 162 lb (73.5 kg)   BMI 26.96 kg/m²      Physical Exam:  General appearance: alert, appears stated age, cooperative and no distress  Head: Normocephalic, without obvious abnormality, atraumatic  Lungs: clear to auscultation bilaterally  Heart: regular rate and rhythm, S1, S2 normal, no murmur, click, rub or gallop  Abdomen: Soft nontender nondistended, incarcerated right lower quadrant incisional hernia and lower aspect of right paramedian incision with normal overlying skin, soft nontender complete reducible umbilical hernia    No visits with results within 6 Week(s) from this visit. Latest known visit with results is:   Orders Only on 08/06/2021   Component Date Value Ref Range Status    TSH 08/06/2021 2.51  0.27 - 4.20 uIU/mL Final    Cholesterol, Fasting 08/06/2021 180  0 - 199 mg/dL Final    Triglyceride, Fasting 08/06/2021 110  0 - 150 mg/dL Final    HDL 08/06/2021 35* 40 - 60 mg/dL Final    LDL Calculated 08/06/2021 123* <100 mg/dL Final    VLDL Cholesterol Calculated 08/06/2021 22  Not Established mg/dL Final    HIV Ag/Ab 08/06/2021 Non-Reactive  Non-reactive Final    HIV-1 Antibody 08/06/2021 Non-Reactive  Non-reactive Final    HIV ANTIGEN 08/06/2021 Non-Reactive  Non-reactive Final    HIV-2 Ab 08/06/2021 Non-Reactive  Non-reactive Final    Hep C Ab Interp 08/06/2021 Non-reactive  Non-reactive Final    Hemoglobin A1C 08/06/2021 4.8  See comment % Final    Comment: Comment:  Diagnosis of Diabetes: > or = 6.5%  Increased risk of diabetes (Prediabetes): 5.7-6.4%  Glycemic Control: Nonpregnant Adults: <7.0%                    Pregnant: <6.0%        eAG 08/06/2021 91.1  mg/dL Final    Sodium 08/06/2021 135* 136 - 145 mmol/L Final    Potassium 08/06/2021 4.9  3.5 - 5.1 mmol/L Final    Chloride 08/06/2021 101  99 - 110 mmol/L Final    CO2 08/06/2021 24  21 - 32 mmol/L Final    Anion Gap 08/06/2021 10  3 - 16 Final    Glucose 08/06/2021 82  70 - 99 mg/dL Final    BUN 08/06/2021 9  7 - 20 mg/dL Final    CREATININE 08/06/2021 0.8* 0.9 - 1.3 mg/dL Final    GFR Non- 08/06/2021 >60  >60 Final    Comment: >60 mL/min/1.73m2 EGFR, calc. for ages 25 and older using the  MDRD formula (not corrected for weight), is valid for stable  renal function.       GFR  08/06/2021 >60  >60 Final Comment: Chronic Kidney Disease: less than 60 ml/min/1.73 sq.m. Kidney Failure: less than 15 ml/min/1.73 sq.m. Results valid for patients 18 years and older.  Calcium 08/06/2021 8.8  8.3 - 10.6 mg/dL Final    Total Protein 08/06/2021 6.8  6.4 - 8.2 g/dL Final    Albumin 08/06/2021 4.2  3.4 - 5.0 g/dL Final    Albumin/Globulin Ratio 08/06/2021 1.6  1.1 - 2.2 Final    Total Bilirubin 08/06/2021 0.7  0.0 - 1.0 mg/dL Final    Alkaline Phosphatase 08/06/2021 71  40 - 129 U/L Final    ALT 08/06/2021 11  10 - 40 U/L Final    AST 08/06/2021 22  15 - 37 U/L Final    Globulin 08/06/2021 2.6  g/dL Final    WBC 08/06/2021 6.7  4.0 - 11.0 K/uL Final    RBC 08/06/2021 5.02  4.20 - 5.90 M/uL Final    Hemoglobin 08/06/2021 15.1  13.5 - 17.5 g/dL Final    Hematocrit 08/06/2021 44.7  40.5 - 52.5 % Final    MCV 08/06/2021 89.0  80.0 - 100.0 fL Final    MCH 08/06/2021 30.0  26.0 - 34.0 pg Final    MCHC 08/06/2021 33.7  31.0 - 36.0 g/dL Final    RDW 08/06/2021 13.4  12.4 - 15.4 % Final    Platelets 69/43/5279 204  135 - 450 K/uL Final    MPV 08/06/2021 8.6  5.0 - 10.5 fL Final    Neutrophils % 08/06/2021 44.3  % Final    Lymphocytes % 08/06/2021 36.3  % Final    Monocytes % 08/06/2021 7.2  % Final    Eosinophils % 08/06/2021 11.1  % Final    Basophils % 08/06/2021 1.1  % Final    Neutrophils Absolute 08/06/2021 3.0  1.7 - 7.7 K/uL Final    Lymphocytes Absolute 08/06/2021 2.4  1.0 - 5.1 K/uL Final    Monocytes Absolute 08/06/2021 0.5  0.0 - 1.3 K/uL Final    Eosinophils Absolute 08/06/2021 0.7* 0.0 - 0.6 K/uL Final    Basophils Absolute 08/06/2021 0.1  0.0 - 0.2 K/uL Final       CT ABDOMEN PELVIS W IV CONTRAST Additional Contrast? Oral    Result Date: 4/13/2022  CT OF THE ABDOMEN AND PELVIS WITH CONTRAST: HISTORY: Incisional hernia, without obstruction or gangrene. TECHNIQUE: Thin section axial images were obtained through the abdomen and pelvis.  Oral contrast and IV administration of 80 mL of Isovue-370 contrast were given. Coronal and sagittal reformatting performed. Up-to-date CT equipment and radiation dose reduction techniques are utilized. COMPARISON: None. FINDINGS: Lower lungs are clear. The heart is normal in size. There is no pericardial or pleural effusion. There is a 1.5 cm low-attenuation lesion of the medial segment of the left lobe of the liver. It is nonspecific, not cystic by Hounsfield numbers. No other focal lesion is seen. The gallbladder is mildly distended. There is a 5 mm stone projecting in the neck. There is no significant intrahepatic or extrahepatic biliary duct dilatation. Pancreas and adrenal glands are normal. There are several low attenuation circumscribed lesions of the kidneys bilaterally. Dominant lesions in the left kidney measure 2 cm in diameter, consistent with benign renal cortical cysts. There is no evidence of renal or ureteral obstruction bilaterally. Bowel is nonobstructed. There is diverticulosis of the distal colon. No free fluid or evidence of adenopathy is seen. There is a small fat-containing umbilical hernia. The right inferior rectus muscle is absent or atrophic. There is a right paramedian abdominal wall hernia (axial image 81), which is 2.5 cm x 1.5 in transverse and vertical diameter. There is herniation of anterior fat into the superficial abdominal wall. (Axial image 81). Bony structures are intact. 2 cm low-attenuation lesion of the medial segment of the left lobe of the liver, nonspecific. Possible complex cyst or hemangioma. Follow-up recommended. Benign cortical cysts of the kidneys. No evidence of renal or ureteral obstruction. Right paramedian abdominal wall hernia containing fat, and small anterior midline fat-containing umbilical hernia. No other acute findings in the abdomen or pelvis. ASSESSMENT:  Incarcerated incisional hernia  Reducible umbilical hernia    PLAN:  1.  We discussed the risks of surgery including bleeding, infection, damage to surrounding structures, conversion to open, hernia recurrence, chronic pain as well as anesthesia related complications. Increased risk of recurrence is associated with smoking, diabetes, obesity as well as heavy lifting over 20lbs sooner than 6 weeks after the surgery. We also discussed minimally invasive vs open technique. The benefits of the procedure include repair of the hernia, prevention of future incarceration and return to normal daily activity. Alternatives discussed include close observation. Details of the procedure were discussed and all questions answered. The patient understands, agrees, and wishes to proceed with minimally invasive procedure  2. Warning signs of a strangulated hernia include increased and constant pain, nausea, vomiting, fevers, chills and constipation. This is a surgical emergency and the patient should contact the office or present to an emergency department  3. Will schedule for robot assisted laparoscopic incisional and umbilical hernia repair with mesh with general anesthesia and as outpatient procedure    Tano Goldstein MD, FACS  4/20/2022  3:04 PM

## 2022-04-20 NOTE — PATIENT INSTRUCTIONS
1. We discussed the risks of surgery including bleeding, infection, damage to surrounding structures, conversion to open, hernia recurrence, chronic pain as well as anesthesia related complications. Increased risk of recurrence is associated with smoking, diabetes, obesity as well as heavy lifting over 20lbs sooner than 6 weeks after the surgery. We also discussed minimally invasive vs open technique. The benefits of the procedure include repair of the hernia, prevention of future incarceration and return to normal daily activity. Alternatives discussed include close observation. Details of the procedure were discussed and all questions answered. The patient understands, agrees, and wishes to proceed with minimally invasive procedure  2. Warning signs of a strangulated hernia include increased and constant pain, nausea, vomiting, fevers, chills and constipation. This is a surgical emergency and the patient should contact the office or present to an emergency department  3.  Will schedule for robot assisted laparoscopic incisional and umbilical hernia repair with mesh with general anesthesia and as outpatient procedure

## 2022-04-21 NOTE — PROGRESS NOTES
the first 24 hrs. Your surgery will be cancelled if you do not have a ride home. 8. A parent/legal guardian must accompany a child scheduled for surgery and plan to stay at the hospital until the child is discharged. Please do not bring other children with you. 9. Please wear simple, loose fitting clothing to the hospital.  Sydell Deal not bring valuables (money, credit cards, checkbooks, etc.) Do not wear any makeup (including no eye makeup) or nail polish on your fingers or toes. 10. DO NOT wear any jewelry or piercings on day of surgery. All body piercing jewelry must be removed. 11. If you have ___dentures, they will be removed before going to the OR; we will provide you a container. If you wear ___contact lenses or ___glasses, they will be removed; please bring a case for them. 12. Please see your family doctor/pediatrician for a history & physical and/or concerning medications. Bring any test results/reports from your physician's office. PCP__________________Phone___________H&P Appt. Date________             13 If you  have a Living Will and Durable Power of  for Healthcare, please bring in a copy. 15. Notify your Surgeon if you develop any illness between now and surgery  time, cough, cold, fever, sore throat, nausea, vomiting, etc.  Please notify your surgeon if you experience dizziness, shortness of breath or blurred vision between now & the time of your surgery             15. DO NOT shave your operative site 96 hours prior to surgery. For face & neck surgery, men may use an electric razor 48 hours prior to surgery. 16. Shower the night before or morning of surgery using an antibacterial soap or as you have been instructed. 17. To provide excellent care visitors will be limited to one in the room at any given time. 18.  Please bring picture ID and insurance card.              19.  Visit our web site for additional information:  [email or web address]              20.During flu season no children under the age of 15 are permitted in the hospital for the safety of all patients. 21. If you take a long acting insulin in the evening only  take half of your usual  dose the night  before your procedure              22. If you use a c-pap please bring DOS if staying overnight,             23.For your convenience [address] [place] [address] has a pharmacy on site to fill your prescriptions. 24. If you use oxygen and have a portable tank please bring it  with you the DOS             25. Bring a complete list of all your medications with name and dose include any supplements. 26. Other__________________________________________   *Please call pre admission testing if you any further questions   [place] [id number]. [place]  [phone]   [address] [place] [address]       VISITOR POLICY(subject to change)    Current policy is 2 visitors per patient. No children. A mask is required. Visiting hours are 8a-8p. Overnight visitors will be at the discretion of the nurse. All above information reviewed with patient in person or by phone. Patient verbalizes understanding. All questions and concerns addressed.                                                                                                  Patient/Rep_patient___________________                                                                                                                                    PRE OP INSTRUCTIONS

## 2022-04-26 ENCOUNTER — ANESTHESIA EVENT (OUTPATIENT)
Dept: OPERATING ROOM | Age: 57
End: 2022-04-26
Payer: COMMERCIAL

## 2022-04-26 ENCOUNTER — HOSPITAL ENCOUNTER (OUTPATIENT)
Age: 57
Setting detail: OUTPATIENT SURGERY
Discharge: HOME | End: 2022-04-26
Attending: SURGERY | Admitting: SURGERY
Payer: COMMERCIAL

## 2022-04-26 ENCOUNTER — ANESTHESIA (OUTPATIENT)
Dept: OPERATING ROOM | Age: 57
End: 2022-04-26
Payer: COMMERCIAL

## 2022-04-26 VITALS
OXYGEN SATURATION: 100 % | TEMPERATURE: 95.4 F | RESPIRATION RATE: 15 BRPM | DIASTOLIC BLOOD PRESSURE: 59 MMHG | SYSTOLIC BLOOD PRESSURE: 123 MMHG

## 2022-04-26 VITALS
OXYGEN SATURATION: 93 % | BODY MASS INDEX: 25.39 KG/M2 | RESPIRATION RATE: 15 BRPM | DIASTOLIC BLOOD PRESSURE: 75 MMHG | SYSTOLIC BLOOD PRESSURE: 123 MMHG | HEIGHT: 66 IN | WEIGHT: 158 LBS | TEMPERATURE: 96.6 F | HEART RATE: 58 BPM

## 2022-04-26 DIAGNOSIS — K43.2 INCISIONAL HERNIA, WITHOUT OBSTRUCTION OR GANGRENE: Primary | ICD-10-CM

## 2022-04-26 PROCEDURE — 3700000001 HC ADD 15 MINUTES (ANESTHESIA): Performed by: SURGERY

## 2022-04-26 PROCEDURE — 2580000003 HC RX 258: Performed by: SURGERY

## 2022-04-26 PROCEDURE — 6360000002 HC RX W HCPCS: Performed by: ANESTHESIOLOGY

## 2022-04-26 PROCEDURE — 3600000019 HC SURGERY ROBOT ADDTL 15MIN: Performed by: SURGERY

## 2022-04-26 PROCEDURE — 7100000000 HC PACU RECOVERY - FIRST 15 MIN: Performed by: SURGERY

## 2022-04-26 PROCEDURE — 3700000000 HC ANESTHESIA ATTENDED CARE: Performed by: SURGERY

## 2022-04-26 PROCEDURE — 3600000009 HC SURGERY ROBOT BASE: Performed by: SURGERY

## 2022-04-26 PROCEDURE — 6370000000 HC RX 637 (ALT 250 FOR IP): Performed by: ANESTHESIOLOGY

## 2022-04-26 PROCEDURE — A4217 STERILE WATER/SALINE, 500 ML: HCPCS | Performed by: SURGERY

## 2022-04-26 PROCEDURE — 7100000001 HC PACU RECOVERY - ADDTL 15 MIN: Performed by: SURGERY

## 2022-04-26 PROCEDURE — 6360000002 HC RX W HCPCS: Performed by: NURSE ANESTHETIST, CERTIFIED REGISTERED

## 2022-04-26 PROCEDURE — C1781 MESH (IMPLANTABLE): HCPCS | Performed by: SURGERY

## 2022-04-26 PROCEDURE — S2900 ROBOTIC SURGICAL SYSTEM: HCPCS | Performed by: SURGERY

## 2022-04-26 PROCEDURE — 2500000003 HC RX 250 WO HCPCS: Performed by: NURSE ANESTHETIST, CERTIFIED REGISTERED

## 2022-04-26 PROCEDURE — 7100000010 HC PHASE II RECOVERY - FIRST 15 MIN: Performed by: SURGERY

## 2022-04-26 PROCEDURE — 49652 PR LAP, VENTRAL HERNIA REPAIR,REDUCIBLE: CPT | Performed by: SURGERY

## 2022-04-26 PROCEDURE — 49655 PR LAP, INCISIONAL HERNIA REPAIR,INCARCERATED: CPT | Performed by: SURGERY

## 2022-04-26 PROCEDURE — 2500000003 HC RX 250 WO HCPCS: Performed by: SURGERY

## 2022-04-26 PROCEDURE — 2709999900 HC NON-CHARGEABLE SUPPLY: Performed by: SURGERY

## 2022-04-26 PROCEDURE — 6360000002 HC RX W HCPCS: Performed by: SURGERY

## 2022-04-26 PROCEDURE — 7100000011 HC PHASE II RECOVERY - ADDTL 15 MIN: Performed by: SURGERY

## 2022-04-26 DEVICE — MESH SURG W4XL6IN ELLIPSE SEPRA TECHNOLOGY VENTRALIGHT: Type: IMPLANTABLE DEVICE | Site: ABDOMEN | Status: FUNCTIONAL

## 2022-04-26 RX ORDER — MIDAZOLAM HYDROCHLORIDE 1 MG/ML
INJECTION INTRAMUSCULAR; INTRAVENOUS PRN
Status: DISCONTINUED | OUTPATIENT
Start: 2022-04-26 | End: 2022-04-26 | Stop reason: SDUPTHER

## 2022-04-26 RX ORDER — NEOSTIGMINE METHYLSULFATE 5 MG/5 ML
SYRINGE (ML) INTRAVENOUS PRN
Status: DISCONTINUED | OUTPATIENT
Start: 2022-04-26 | End: 2022-04-26 | Stop reason: SDUPTHER

## 2022-04-26 RX ORDER — SODIUM CHLORIDE 9 MG/ML
INJECTION, SOLUTION INTRAVENOUS PRN
Status: DISCONTINUED | OUTPATIENT
Start: 2022-04-26 | End: 2022-04-26 | Stop reason: HOSPADM

## 2022-04-26 RX ORDER — FENTANYL CITRATE 50 UG/ML
25 INJECTION, SOLUTION INTRAMUSCULAR; INTRAVENOUS EVERY 5 MIN PRN
Status: DISCONTINUED | OUTPATIENT
Start: 2022-04-26 | End: 2022-04-26 | Stop reason: HOSPADM

## 2022-04-26 RX ORDER — LIDOCAINE HYDROCHLORIDE 10 MG/ML
1 INJECTION, SOLUTION EPIDURAL; INFILTRATION; INTRACAUDAL; PERINEURAL
Status: DISCONTINUED | OUTPATIENT
Start: 2022-04-26 | End: 2022-04-26 | Stop reason: HOSPADM

## 2022-04-26 RX ORDER — ROCURONIUM BROMIDE 10 MG/ML
INJECTION, SOLUTION INTRAVENOUS PRN
Status: DISCONTINUED | OUTPATIENT
Start: 2022-04-26 | End: 2022-04-26 | Stop reason: SDUPTHER

## 2022-04-26 RX ORDER — MAGNESIUM HYDROXIDE 1200 MG/15ML
LIQUID ORAL CONTINUOUS PRN
Status: COMPLETED | OUTPATIENT
Start: 2022-04-26 | End: 2022-04-26

## 2022-04-26 RX ORDER — BUPIVACAINE HYDROCHLORIDE 5 MG/ML
INJECTION, SOLUTION EPIDURAL; INTRACAUDAL
Status: COMPLETED | OUTPATIENT
Start: 2022-04-26 | End: 2022-04-26

## 2022-04-26 RX ORDER — HYDRALAZINE HYDROCHLORIDE 20 MG/ML
10 INJECTION INTRAMUSCULAR; INTRAVENOUS
Status: DISCONTINUED | OUTPATIENT
Start: 2022-04-26 | End: 2022-04-26 | Stop reason: HOSPADM

## 2022-04-26 RX ORDER — PROPOFOL 10 MG/ML
INJECTION, EMULSION INTRAVENOUS PRN
Status: DISCONTINUED | OUTPATIENT
Start: 2022-04-26 | End: 2022-04-26 | Stop reason: SDUPTHER

## 2022-04-26 RX ORDER — GLYCOPYRROLATE 1 MG/5 ML
SYRINGE (ML) INTRAVENOUS PRN
Status: DISCONTINUED | OUTPATIENT
Start: 2022-04-26 | End: 2022-04-26 | Stop reason: SDUPTHER

## 2022-04-26 RX ORDER — HYDROMORPHONE HCL 110MG/55ML
0.5 PATIENT CONTROLLED ANALGESIA SYRINGE INTRAVENOUS EVERY 5 MIN PRN
Status: DISCONTINUED | OUTPATIENT
Start: 2022-04-26 | End: 2022-04-26 | Stop reason: HOSPADM

## 2022-04-26 RX ORDER — OXYCODONE HYDROCHLORIDE 5 MG/1
5 TABLET ORAL
Status: COMPLETED | OUTPATIENT
Start: 2022-04-26 | End: 2022-04-26

## 2022-04-26 RX ORDER — SUCCINYLCHOLINE/SOD CL,ISO/PF 200MG/10ML
SYRINGE (ML) INTRAVENOUS PRN
Status: DISCONTINUED | OUTPATIENT
Start: 2022-04-26 | End: 2022-04-26 | Stop reason: SDUPTHER

## 2022-04-26 RX ORDER — OXYCODONE HYDROCHLORIDE 5 MG/1
5 TABLET ORAL EVERY 4 HOURS PRN
Qty: 20 TABLET | Refills: 0 | Status: SHIPPED | OUTPATIENT
Start: 2022-04-26 | End: 2022-05-03

## 2022-04-26 RX ORDER — FENTANYL CITRATE 50 UG/ML
INJECTION, SOLUTION INTRAMUSCULAR; INTRAVENOUS PRN
Status: DISCONTINUED | OUTPATIENT
Start: 2022-04-26 | End: 2022-04-26 | Stop reason: SDUPTHER

## 2022-04-26 RX ORDER — LABETALOL HYDROCHLORIDE 5 MG/ML
10 INJECTION, SOLUTION INTRAVENOUS
Status: DISCONTINUED | OUTPATIENT
Start: 2022-04-26 | End: 2022-04-26 | Stop reason: HOSPADM

## 2022-04-26 RX ORDER — DEXAMETHASONE SODIUM PHOSPHATE 4 MG/ML
INJECTION, SOLUTION INTRA-ARTICULAR; INTRALESIONAL; INTRAMUSCULAR; INTRAVENOUS; SOFT TISSUE PRN
Status: DISCONTINUED | OUTPATIENT
Start: 2022-04-26 | End: 2022-04-26 | Stop reason: SDUPTHER

## 2022-04-26 RX ORDER — SODIUM CHLORIDE 0.9 % (FLUSH) 0.9 %
5-40 SYRINGE (ML) INJECTION PRN
Status: DISCONTINUED | OUTPATIENT
Start: 2022-04-26 | End: 2022-04-26 | Stop reason: HOSPADM

## 2022-04-26 RX ORDER — ONDANSETRON 2 MG/ML
4 INJECTION INTRAMUSCULAR; INTRAVENOUS
Status: DISCONTINUED | OUTPATIENT
Start: 2022-04-26 | End: 2022-04-26 | Stop reason: HOSPADM

## 2022-04-26 RX ORDER — SODIUM CHLORIDE, SODIUM LACTATE, POTASSIUM CHLORIDE, CALCIUM CHLORIDE 600; 310; 30; 20 MG/100ML; MG/100ML; MG/100ML; MG/100ML
INJECTION, SOLUTION INTRAVENOUS CONTINUOUS
Status: DISCONTINUED | OUTPATIENT
Start: 2022-04-26 | End: 2022-04-26 | Stop reason: HOSPADM

## 2022-04-26 RX ORDER — SODIUM CHLORIDE 0.9 % (FLUSH) 0.9 %
5-40 SYRINGE (ML) INJECTION EVERY 12 HOURS SCHEDULED
Status: DISCONTINUED | OUTPATIENT
Start: 2022-04-26 | End: 2022-04-26 | Stop reason: HOSPADM

## 2022-04-26 RX ORDER — LIDOCAINE HYDROCHLORIDE 20 MG/ML
INJECTION, SOLUTION EPIDURAL; INFILTRATION; INTRACAUDAL; PERINEURAL PRN
Status: DISCONTINUED | OUTPATIENT
Start: 2022-04-26 | End: 2022-04-26 | Stop reason: SDUPTHER

## 2022-04-26 RX ORDER — ONDANSETRON 2 MG/ML
INJECTION INTRAMUSCULAR; INTRAVENOUS PRN
Status: DISCONTINUED | OUTPATIENT
Start: 2022-04-26 | End: 2022-04-26 | Stop reason: SDUPTHER

## 2022-04-26 RX ADMIN — ROCURONIUM BROMIDE 10 MG: 10 INJECTION, SOLUTION INTRAVENOUS at 09:12

## 2022-04-26 RX ADMIN — MIDAZOLAM 2 MG: 1 INJECTION INTRAMUSCULAR; INTRAVENOUS at 09:12

## 2022-04-26 RX ADMIN — CEFAZOLIN SODIUM 2000 MG: 10 INJECTION, POWDER, FOR SOLUTION INTRAVENOUS at 09:24

## 2022-04-26 RX ADMIN — FENTANYL CITRATE 25 MCG: 0.05 INJECTION, SOLUTION INTRAMUSCULAR; INTRAVENOUS at 11:15

## 2022-04-26 RX ADMIN — ONDANSETRON 4 MG: 2 INJECTION INTRAMUSCULAR; INTRAVENOUS at 09:20

## 2022-04-26 RX ADMIN — SODIUM CHLORIDE, POTASSIUM CHLORIDE, SODIUM LACTATE AND CALCIUM CHLORIDE: 600; 310; 30; 20 INJECTION, SOLUTION INTRAVENOUS at 08:43

## 2022-04-26 RX ADMIN — LIDOCAINE HYDROCHLORIDE 100 MG: 20 INJECTION, SOLUTION EPIDURAL; INFILTRATION; INTRACAUDAL; PERINEURAL at 09:12

## 2022-04-26 RX ADMIN — OXYCODONE 5 MG: 5 TABLET ORAL at 12:59

## 2022-04-26 RX ADMIN — FENTANYL CITRATE 25 MCG: 0.05 INJECTION, SOLUTION INTRAMUSCULAR; INTRAVENOUS at 10:43

## 2022-04-26 RX ADMIN — DEXAMETHASONE SODIUM PHOSPHATE 10 MG: 4 INJECTION, SOLUTION INTRAMUSCULAR; INTRAVENOUS at 09:19

## 2022-04-26 RX ADMIN — Medication 2 MG: at 10:09

## 2022-04-26 RX ADMIN — ROCURONIUM BROMIDE 40 MG: 10 INJECTION, SOLUTION INTRAVENOUS at 09:16

## 2022-04-26 RX ADMIN — FENTANYL CITRATE 100 MCG: 50 INJECTION, SOLUTION INTRAMUSCULAR; INTRAVENOUS at 09:12

## 2022-04-26 RX ADMIN — Medication 3 MG: at 10:06

## 2022-04-26 RX ADMIN — Medication 0.2 MG: at 09:56

## 2022-04-26 RX ADMIN — PROPOFOL 120 MG: 10 INJECTION, EMULSION INTRAVENOUS at 09:26

## 2022-04-26 RX ADMIN — Medication 160 MG: at 09:12

## 2022-04-26 ASSESSMENT — PULMONARY FUNCTION TESTS
PIF_VALUE: 28
PIF_VALUE: 20
PIF_VALUE: 14
PIF_VALUE: 21
PIF_VALUE: 27
PIF_VALUE: 27
PIF_VALUE: 13
PIF_VALUE: 1
PIF_VALUE: 27
PIF_VALUE: 15
PIF_VALUE: 20
PIF_VALUE: 27
PIF_VALUE: 20
PIF_VALUE: 27
PIF_VALUE: 13
PIF_VALUE: 22
PIF_VALUE: 14
PIF_VALUE: 28
PIF_VALUE: 16
PIF_VALUE: 21
PIF_VALUE: 27
PIF_VALUE: 27
PIF_VALUE: 3
PIF_VALUE: 27
PIF_VALUE: 13
PIF_VALUE: 1
PIF_VALUE: 15
PIF_VALUE: 1
PIF_VALUE: 15
PIF_VALUE: 26
PIF_VALUE: 15
PIF_VALUE: 27
PIF_VALUE: 13
PIF_VALUE: 17
PIF_VALUE: 12
PIF_VALUE: 15
PIF_VALUE: 27
PIF_VALUE: 28
PIF_VALUE: 16
PIF_VALUE: 19
PIF_VALUE: 21
PIF_VALUE: 26
PIF_VALUE: 13
PIF_VALUE: 3
PIF_VALUE: 1
PIF_VALUE: 24
PIF_VALUE: 20
PIF_VALUE: 27
PIF_VALUE: 20
PIF_VALUE: 8
PIF_VALUE: 27
PIF_VALUE: 27
PIF_VALUE: 12
PIF_VALUE: 28
PIF_VALUE: 12
PIF_VALUE: 14
PIF_VALUE: 13
PIF_VALUE: 22
PIF_VALUE: 14
PIF_VALUE: 13
PIF_VALUE: 14
PIF_VALUE: 21
PIF_VALUE: 27
PIF_VALUE: 21
PIF_VALUE: 23
PIF_VALUE: 22
PIF_VALUE: 17

## 2022-04-26 ASSESSMENT — PAIN SCALES - GENERAL
PAINLEVEL_OUTOF10: 4
PAINLEVEL_OUTOF10: 4
PAINLEVEL_OUTOF10: 6
PAINLEVEL_OUTOF10: 6

## 2022-04-26 ASSESSMENT — PAIN DESCRIPTION - PAIN TYPE
TYPE: SURGICAL PAIN
TYPE: SURGICAL PAIN

## 2022-04-26 ASSESSMENT — PAIN DESCRIPTION - LOCATION
LOCATION: ABDOMEN
LOCATION: ABDOMEN

## 2022-04-26 ASSESSMENT — PAIN - FUNCTIONAL ASSESSMENT: PAIN_FUNCTIONAL_ASSESSMENT: NONE - DENIES PAIN

## 2022-04-26 ASSESSMENT — ENCOUNTER SYMPTOMS: SHORTNESS OF BREATH: 0

## 2022-04-26 ASSESSMENT — PAIN DESCRIPTION - DESCRIPTORS: DESCRIPTORS: STABBING

## 2022-04-26 NOTE — ANESTHESIA POSTPROCEDURE EVALUATION
Department of Anesthesiology  Postprocedure Note    Patient: Jacob Hayden  MRN: [id number]  YOB: 1965  Date of evaluation: 4/26/2022  Time:  12:18 PM     Procedure Summary     Date: 04/26/22 Room / Location: [address]    Anesthesia Start: 5258 Anesthesia Stop: 2003    Procedure: ROBOT ASSISTED LAPAROSCOPIC [facility] ([id number], [id number]) (N/A Abdomen) Diagnosis:       (K43.0  INCARCERATED INCISIONAL HERNIA)      (L32.9  REDUCIBLE UMBILICAL HERNIA)    Surgeons: Sheri Gil MD Responsible Provider: Namrata Vee MD    Anesthesia Type: general ASA Status: 1          Anesthesia Type: general    Mingo Phase I: Mingo Score: 8    Mingo Phase II:      Last vitals: Reviewed and per EMR flowsheets.        Anesthesia Post Evaluation    Patient location during evaluation: PACU  Patient participation: complete - patient participated  Level of consciousness: awake and alert  Airway patency: patent  Nausea & Vomiting: no nausea and no vomiting  Complications: no  Cardiovascular status: hemodynamically stable  Respiratory status: acceptable  Hydration status: stable  Multimodal analgesia pain management approach

## 2022-04-26 NOTE — ANESTHESIA PRE PROCEDURE
Department of Anesthesiology  Preprocedure Note       Name:  Preston Ortiz   Age:  65 y.o.  :  1965                                          MRN:  [id number]         Date:  2022      Surgeon: Rosanna Looney):  Cristal Vaz MD    Procedure: Procedure(s):  ROBOT ASSISTED LAPAROSCOPIC INCISIONAL AND UMBILICAL HERNIA REPAIR WITH MESH ([id number], [id number])    Medications prior to admission:   Prior to Admission medications    Not on File       Current medications:    No current facility-administered medications for this encounter. Allergies:  No Known Allergies    Problem List:    Patient Active Problem List   Diagnosis Code    Incisional hernia, without obstruction or gangrene K43.2       Past Medical History:        Diagnosis Date    Hernia, abdominal        Past Surgical History:        Procedure Laterality Date    APPENDECTOMY      COLONOSCOPY N/A 2019    COLON[facility] Jeff Perdue MD [facility] [address] [facility] [address] History:    Social History     Tobacco Use    Smoking status: Never Smoker    Smokeless tobacco: Never Used   Substance Use Topics    Alcohol use: No                                Counseling given: Not Answered      Vital Signs (Current):   Vitals:    22 1257   Weight: 160 lb (72.6 kg)   Height: 5' 6\" (1.676 m)                                              BP Readings from Last 3 Encounters:   22 106/70   22 120/70   22 110/80       NPO Status:                                                                                 BMI:   Wt Readings from Last 3 Encounters:   22 160 lb (72.6 kg)   22 162 lb (73.5 kg)   22 162 lb (73.5 kg)     Body mass index is 25.82 kg/m².     CBC:   Lab Results   Component Value Date    WBC 6.7 2021    RBC 5.02 2021    HGB 15.1 2021    HCT 44.7 2021    MCV 89.0 2021    RDW 13.4 2021     2021       CMP:   Lab Results   Component Value Date    NA 135 08/06/2021    K 4.9 08/06/2021     08/06/2021    CO2 24 08/06/2021    BUN 9 08/06/2021    CREATININE 0.8 08/06/2021    GFRAA >60 08/06/2021    AGRATIO 1.6 08/06/2021    LABGLOM >60 08/06/2021    GLUCOSE 82 08/06/2021    PROT 6.8 08/06/2021    CALCIUM 8.8 08/06/2021    BILITOT 0.7 08/06/2021    ALKPHOS 71 08/06/2021    AST 22 08/06/2021    ALT 11 08/06/2021       POC Tests: No results for input(s): POCGLU, POCNA, POCK, POCCL, POCBUN, POCHEMO, POCHCT in the last 72 hours. Coags: No results found for: PROTIME, INR, APTT    HCG (If Applicable): No results found for: PREGTESTUR, PREGSERUM, HCG, HCGQUANT     ABGs: No results found for: PHART, PO2ART, AKY1AVL, MZA1JEY, BEART, Q8OEUZMS     Type & Screen (If Applicable):  No results found for: LABABO, LABRH    Drug/Infectious Status (If Applicable):  No results found for: HIV, HEPCAB    COVID-19 Screening (If Applicable): No results found for: COVID19        Anesthesia Evaluation  Patient summary reviewed and Nursing notes reviewed no history of anesthetic complications:   Airway: Mallampati: I  TM distance: >3 FB   Neck ROM: full  Mouth opening: > = 3 FB Dental: normal exam         Pulmonary:       (-) asthma and shortness of breath                           Cardiovascular:        (-) hypertension and  angina                Neuro/Psych:      (-) CVA           GI/Hepatic/Renal:        (-) GERD and liver disease       Endo/Other:        (-) diabetes mellitus, hypothyroidism               Abdominal:             Vascular:     - PVD. Other Findings:             Anesthesia Plan      general     ASA 1       Induction: intravenous. MIPS: Postoperative opioids intended and Prophylactic antiemetics administered. Anesthetic plan and risks discussed with patient. Use of blood products discussed with patient whom. Plan discussed with CRNA.                   Tyrel Lawrence MD   4/26/2022

## 2022-04-26 NOTE — PROGRESS NOTES
Pt resting quietly in bed with eyes closed, awakens easily, states pain is tolerable in his abdomen, rates as a 4 out of 10. VSS, O2 sats 93% on room air. Incisions to abdomen dry and intact, abdomen soft, ice pack and abdominal binder remain in place. Pt seen by anesthesia, phase 1 criteria met. Will transfer pt to same day for discharge.

## 2022-04-26 NOTE — PROGRESS NOTES
Discharge instructions reviewed and understanding verbalized per pt/family with copy given. All home medications/new prescriptions have been reviewed, questions answered and patient/family state understanding.  Medication information sheet provided for new prescriptions received when applicable    PT DISCHARGED HOME WITH FAMILY, PT IN STABLE CONDITION, TRANSPORTED TO CAR VIA WHEELCHAIR WITH [facility]

## 2022-04-26 NOTE — BRIEF OP NOTE
[address] and Laparoscopic Surgery  Brief Operative Note  Pt Name: Tuan Henry  CSN: [id number]  YOB: 1965    Date of Procedure: 4/26/2022    Pre-operative Diagnosis: Incarcerated incisional and reducible umbilical hernia    Post-operative Diagnosis: same     Procedure: Robot assisted laparoscopic repair of incarcerated incisional and reducible umbilical hernia with mesh    Surgeon(s):  Tatum Mendiola MD     Surgical Assistant: Ladarius Campbell RN    Anesthesia:  General anesthesia    Findings: Full note dictated, Dictation Job Number: [id number]    Estimated Blood Loss: less than 50  ml    Complications: None    Specimens: none  * No specimens in log *     Implants:  Implant Name Type Inv. Item Serial No.  Lot No. LRB No. Used Action   MESH SURG [id number] ELLIPSE SEPRA Cyndi Atkins [id number]  MESH SURG [id number] ELLIPSE SEPRA TECHN[employer] Elba Coffey [id number] Right 1 Implanted       Drains: none   * No LDAs found *    Condition: stable     Disposition and Post-operative plan: PACU, home     Tano Parikh MD, FACS  4/26/2022  10:06 AM

## 2022-04-26 NOTE — H&P
[facility] and Laparoscopic [facility]    I have reviewed the history and physical and examined the patient and find no relevant changes. I have reviewed with the patient and/or family the risks, benefits, and alternatives to the procedure. Praveen Pimentel 6  Charlie Goldstein MD, FACS  4/26/2022  8:36 AM

## 2022-04-26 NOTE — PROGRESS NOTES
CLINICAL PHARMACY NOTE: MEDS TO BEDS    Total # of Prescriptions Filled: 1   The following medications were delivered to the patient:  · Oxycodone 5 mg    Additional Documentation:  Delivered to Patient daughter=Signed  Khalif Walker CPhT

## 2022-04-26 NOTE — PROGRESS NOTES
Pt to PACU from OR, arouses to voice. VSS. x3 surgical incisions to abdomen CDI, abdominal binder and ice applied.  Will continue to monitor

## 2022-04-27 NOTE — OP NOTE
[facility] [place] [facility]\"" 124                     350 [facility], [address] [place] [address]                                OPERATIVE REPORT    PATIENT NAME: Italo Suero                 :        1965  MED REC NO:   [id number]                          ROOM:  ACCOUNT NO:   [de-identified]                           ADMIT DATE: 2022  PROVIDER:     Tiki Lynch MD    DATE OF PROCEDURE:  2022    PREOPERATIVE DIAGNOSES:  Incarcerated incisional hernia and reducible  umbilical hernia. POSTOPERATIVE DIAGNOSES:  Incarcerated incisional hernia and reducible  umbilical hernia. OPERATION PERFORMED:  Robot-assisted laparoscopic repair of incarcerated  incisional and reducible umbilical hernia with mesh. SURGEON:  Tiki Lynch MD    ANESTHESIA:  General.    INDICATIONS:  This is a 72-year-old male who presented to my office with  a symptomatic incarcerated right abdominal hernia. The patient has a  surgical history of an open appendectomy in [facility] done with a right  paramedian incision that developed a hernia. On additional exam, the  patient was found to have reducible umbilical hernia. These can both be  repaired during the same procedure and the risks, benefits, and  alternative treatments of robot-assisted laparoscopic repair of the  incarcerated incisional and reducible umbilical hernia with mesh were  discussed. Details of the procedure were discussed. All questions were  answered. The patient understood, agreed and wished to proceed. OPERATIVE PROCEDURE:  The patient was brought to the operative suite,  laid in supine position. General anesthesia was induced and well  tolerated. The patient's abdomen was prepped and draped in the usual  sterile fashion. After a proper time-out was performed, verifying  operating site, procedure and patient, a left upper quadrant incision  was made with a scalpel.   Using an Optiview technique, the trocar was  traversed through the abdominal wall and placed into the peritoneal  cavity under direct vision. The peritoneal cavity was then insufflated  with carbon dioxide to a pressure of 15 mmHg, which was well tolerated. Additional trocars were then placed in the following locations, an 8 mm  left mid and left lower quadrant under direct vision. The 5 mm was  upgraded to an to allow for robotic instrumentation. The abdomen was  surveyed and there were two hernias as expected. The umbilical hernia  has no abdominal contents. There were moderate amount of omental  adhesions to the intraabdominal wall that were taken down with blunt  dissection and sharp dissection as well as careful cautery. The right  abdomen had an incarcerated incisional hernia with omentum in the hernia  sac that was reduced into the abdominal cavity with sharp, blunt and  careful cautery dissection. The fascial defects were separate, but  close in proximity such that a 10 x 15 oval Ventralight ST mesh would be  able to cover the both well. The hernia defects were closed separately  in two layers with 0 Stratafix suture and then remainder of the stitch  was used to center the mesh in an oblique fashion to cover both of the  hernia defects and the Stratafix was used to secure the mesh in the  intraabdominal wall as well to minimize the risk of seroma. The  periphery of the mesh was secured to the anterior abdominal wall with  multiple running V-Loc barbed suture. All needles were carefully  removed. The mesh appeared to lay flat without any tension or buckling  and gave excellent overlap in all directions along the hernia defects. The abdomen was allowed to desufflate and all trocars were then removed  under direct vision. Local anesthetic was injected in the wounds and  all skin incisions were closed with 4-0 suture. The wounds were then  cleaned and dressed with Dermabond.   The patient tolerated the procedure  well and was transferred to PACU in stable condition. SPECIMENS:  None. DRAINS:  None. COMPLICATIONS:  None. BLOOD LOSS:  Less than 50 mL.         Aeljandra Choudhary MD    D: 04/26/2022 16:53:35       T: 04/27/2022 2:29:23     [email or web address]  Job#: [id number]     Doc#: [id number]    CC:

## 2022-05-04 ENCOUNTER — OFFICE VISIT (OUTPATIENT)
Dept: SURGERY | Age: 57
End: 2022-05-04

## 2022-05-04 VITALS — WEIGHT: 156 LBS | BODY MASS INDEX: 25.18 KG/M2 | SYSTOLIC BLOOD PRESSURE: 118 MMHG | DIASTOLIC BLOOD PRESSURE: 70 MMHG

## 2022-05-04 DIAGNOSIS — Z09 SURGERY FOLLOW-UP: Primary | ICD-10-CM

## 2022-05-04 PROCEDURE — 99024 POSTOP FOLLOW-UP VISIT: CPT | Performed by: SURGERY

## 2022-05-04 NOTE — PATIENT INSTRUCTIONS
No heavy lifting over 20lbs for 6 weeks total postop  Diet and activity as tolerated otherwise  Follow up with general surgery office as needed

## 2022-05-04 NOTE — PROGRESS NOTES
[facility] and Laparoscopic Surgery  SUBJECTIVE:    Margaret Crespo   1965   65 y.o. male presents for routine postoperative followup after robot-assisted laparoscopic repair of incarcerated incisional and reducible umbilical hernia with mesh on April 26, 2022. Incisional pain slowly improving. Not taking oxycodone and only small amounts of Tylenol ibuprofen. Still feels lump over prior hernia site is likely seroma but is causing some concern. Tolerating diet. Ambulating well otherwise. Bowels normalized. Past Medical History:   Diagnosis Date    Hernia, abdominal      Past Surgical History:   Procedure Laterality Date    APPENDECTOMY      COLONOSCOPY N/A 2/8/2019    COLONOSCOPY performed by Jacky Michel MD at [address] [place] [employer] N/A 4/26/2022    ROBOT ASSISTED LAPAROSCOPIC INCISIONAL AND UMBILICAL HERNIA REPAIR WITH MESH ([id number]) performed by Sarina Garibay MD at [address] History     Socioeconomic History    Marital status:      Spouse name: Not on file    Number of children: Not on file    Years of education: Not on file    Highest education level: Not on file   Occupational History    Not on file   Tobacco Use    Smoking status: Never Smoker    Smokeless tobacco: Never Used   Vaping Use    Vaping Use: Never used   Substance and Sexual Activity    Alcohol use: No    Drug use: No    Sexual activity: Not on file   Other Topics Concern    Not on file   Social History Narrative    Not on file     Social Determinants of Health     Financial Resource Strain: Low Risk     Difficulty of Paying Living Expenses: Not hard at all   Food Insecurity: No Food Insecurity    Worried About [address] in the Last Year: Never true    [address] in the Last Year: Never true   Transportation Needs:     Lack of Transportation (Medical): Not on file    Lack of Transportation (Non-Medical):  Not on file   Physical Activity:     Days of Exercise per Week: Not on file    Minutes of Exercise per Session: Not on file   Stress:     Feeling of Stress : Not on file   Social Connections:     Frequency of Communication with Friends and Family: Not on file    Frequency of Social Gatherings with Friends and Family: Not on file    Attends Restorationism Services: Not on file    Active Member of [address] [place] [address] [employer] or Organizations: Not on file    Attends Club or Organization Meetings: Not on file    Marital Status: Not on file   Intimate Partner Violence:     Fear of Current or Ex-Partner: Not on file    Emotionally Abused: Not on file    Physically Abused: Not on file    Sexually Abused: Not on file   Housing Stability:     Unable to Pay for Housing in the Last Year: Not on file    Number of [place] in the Last Year: Not on file    Unstable Housing in the Last Year: Not on file      Family History   Problem Relation Age of Onset    Diabetes Mother     Cancer Paternal Aunt      No current outpatient medications on file. No current facility-administered medications for this visit. No Known Allergies     Review of Systems:  Review of systems performed and negative with the exception of the above findings    OBJECTIVE:  /70   Wt 156 lb (70.8 kg)   BMI 25.18 kg/m²      Physical Exam:  General appearance: alert, appears stated age, cooperative and no distress  Abdomen: soft, non-distended, appropriate incisional tenderness, incisions clean dry and intact, palpable seroma over former incisional hernia site on right abdomen is without signs of complication and does not need intervention    No visits with results within 6 Week(s) from this visit.    Latest known visit with results is:   Orders Only on 08/06/2021   Component Date Value Ref Range Status    TSH 08/06/2021 2.51  0.27 - 4.20 uIU/mL Final    Cholesterol, Fasting 08/06/2021 180  0 - 199 mg/dL Final    Triglyceride, Fasting 08/06/2021 110  0 - 150 mg/dL Final    HDL 08/06/2021 35* 40 - 60 mg/dL Final    LDL Calculated 08/06/2021 123* <100 mg/dL Final    VLDL Cholesterol Calculated 08/06/2021 22  Not Established mg/dL Final    HIV Ag/Ab 08/06/2021 Non-Reactive  Non-reactive Final    HIV-1 Antibody 08/06/2021 Non-Reactive  Non-reactive Final    HIV ANTIGEN 08/06/2021 Non-Reactive  Non-reactive Final    HIV-2 Ab 08/06/2021 Non-Reactive  Non-reactive Final    Hep C Ab Interp 08/06/2021 Non-reactive  Non-reactive Final    Hemoglobin A1C 08/06/2021 4.8  See comment % Final    Comment: Comment:  Diagnosis of Diabetes: > or = 6.5%  Increased risk of diabetes (Prediabetes): 5.7-6.4%  Glycemic Control: Nonpregnant Adults: <7.0%                    Pregnant: <6.0%        eAG 08/06/2021 91.1  mg/dL Final    Sodium 08/06/2021 135* 136 - 145 mmol/L Final    Potassium 08/06/2021 4.9  3.5 - 5.1 mmol/L Final    Chloride 08/06/2021 101  99 - 110 mmol/L Final    CO2 08/06/2021 24  21 - 32 mmol/L Final    Anion Gap 08/06/2021 10  3 - 16 Final    Glucose 08/06/2021 82  70 - 99 mg/dL Final    BUN 08/06/2021 9  7 - 20 mg/dL Final    CREATININE 08/06/2021 0.8* 0.9 - 1.3 mg/dL Final    GFR Non- 08/06/2021 >60  >60 Final    Comment: >60 mL/min/1.73m2 EGFR, calc. for ages 25 and older using the  MDRD formula (not corrected for weight), is valid for stable  renal function.  GFR  08/06/2021 >60  >60 Final    Comment: Chronic Kidney Disease: less than 60 ml/min/1.73 sq.m. Kidney Failure: less than 15 ml/min/1.73 sq.m. Results valid for patients 18 years and older.       Calcium 08/06/2021 8.8  8.3 - 10.6 mg/dL Final    Total Protein 08/06/2021 6.8  6.4 - 8.2 g/dL Final    Albumin 08/06/2021 4.2  3.4 - 5.0 g/dL Final    Albumin/Globulin Ratio 08/06/2021 1.6  1.1 - 2.2 Final    Total Bilirubin 08/06/2021 0.7  0.0 - 1.0 mg/dL Final    Alkaline Phosphatase 08/06/2021 71  40 - 129 U/L Final    ALT 08/06/2021 11  10 - 40 U/L Final    AST 08/06/2021 22  15 - 37 U/L Final    Globulin 08/06/2021 2.6  g/dL Final    WBC 08/06/2021 6.7  4.0 - 11.0 K/uL Final    RBC 08/06/2021 5.02  4.20 - 5.90 M/uL Final    Hemoglobin 08/06/2021 15.1  13.5 - 17.5 g/dL Final    Hematocrit 08/06/2021 44.7  40.5 - 52.5 % Final    MCV 08/06/2021 89.0  80.0 - 100.0 fL Final    MCH 08/06/2021 30.0  26.0 - 34.0 pg Final    MCHC 08/06/2021 33.7  31.0 - 36.0 g/dL Final    RDW 08/06/2021 13.4  12.4 - 15.4 % Final    Platelets 47/30/1334 204  135 - 450 K/uL Final    MPV 08/06/2021 8.6  5.0 - 10.5 fL Final    Neutrophils % 08/06/2021 44.3  % Final    Lymphocytes % 08/06/2021 36.3  % Final    Monocytes % 08/06/2021 7.2  % Final    Eosinophils % 08/06/2021 11.1  % Final    Basophils % 08/06/2021 1.1  % Final    Neutrophils Absolute 08/06/2021 3.0  1.7 - 7.7 K/uL Final    Lymphocytes Absolute 08/06/2021 2.4  1.0 - 5.1 K/uL Final    Monocytes Absolute 08/06/2021 0.5  0.0 - 1.3 K/uL Final    Eosinophils Absolute 08/06/2021 0.7* 0.0 - 0.6 K/uL Final    Basophils Absolute 08/06/2021 0.1  0.0 - 0.2 K/uL Final       CT ABDOMEN PELVIS W IV CONTRAST Additional Contrast? Oral    Result Date: 4/13/2022  CT OF THE ABDOMEN AND PELVIS WITH CONTRAST: HISTORY: Incisional hernia, without obstruction or gangrene. TECHNIQUE: Thin section axial images were obtained through the abdomen and pelvis. Oral contrast and IV administration of 80 mL of Isovue-370 contrast were given. Coronal and sagittal reformatting performed. Up-to-date CT equipment and radiation dose reduction techniques are utilized. COMPARISON: None. FINDINGS: Lower lungs are clear. The heart is normal in size. There is no pericardial or pleural effusion. There is a 1.5 cm low-attenuation lesion of the medial segment of the left lobe of the liver. It is nonspecific, not cystic by Hounsfield numbers. No other focal lesion is seen. The gallbladder is mildly distended. There is a 5 mm stone projecting in the neck.  There is no significant intrahepatic or extrahepatic biliary duct dilatation. Pancreas and adrenal glands are normal. There are several low attenuation circumscribed lesions of the kidneys bilaterally. Dominant lesions in the left kidney measure 2 cm in diameter, consistent with benign renal cortical cysts. There is no evidence of renal or ureteral obstruction bilaterally. Bowel is nonobstructed. There is diverticulosis of the distal colon. No free fluid or evidence of adenopathy is seen. There is a small fat-containing umbilical hernia. The right inferior rectus muscle is absent or atrophic. There is a right paramedian abdominal wall hernia (axial image 81), which is 2.5 cm x 1.5 in transverse and vertical diameter. There is herniation of anterior fat into the superficial abdominal wall. (Axial image 81). Bony structures are intact. 2 cm low-attenuation lesion of the medial segment of the left lobe of the liver, nonspecific. Possible complex cyst or hemangioma. Follow-up recommended. Benign cortical cysts of the kidneys. No evidence of renal or ureteral obstruction. Right paramedian abdominal wall hernia containing fat, and small anterior midline fat-containing umbilical hernia. No other acute findings in the abdomen or pelvis. Pathology:  none    Assessment:  robot-assisted laparoscopic repair of incarcerated incisional and reducible umbilical hernia with mesh on April 26, 2022    Plan:  No heavy lifting over 20lbs for 6 weeks total postop  Diet and activity as tolerated otherwise  Follow up with general surgery office as needed    Nina Larios MD, FACS  5/4/2022  2:02 PM

## 2022-09-19 ENCOUNTER — OFFICE VISIT (OUTPATIENT)
Dept: FAMILY MEDICINE CLINIC | Age: 57
End: 2022-09-19
Payer: COMMERCIAL

## 2022-09-19 VITALS
OXYGEN SATURATION: 98 % | WEIGHT: 167 LBS | HEART RATE: 91 BPM | SYSTOLIC BLOOD PRESSURE: 120 MMHG | DIASTOLIC BLOOD PRESSURE: 70 MMHG | BODY MASS INDEX: 26.84 KG/M2 | HEIGHT: 66 IN

## 2022-09-19 DIAGNOSIS — R07.9 CHEST PAIN, UNSPECIFIED TYPE: ICD-10-CM

## 2022-09-19 DIAGNOSIS — R21 RASH: ICD-10-CM

## 2022-09-19 DIAGNOSIS — Z00.00 ANNUAL PHYSICAL EXAM: Primary | ICD-10-CM

## 2022-09-19 PROCEDURE — 99396 PREV VISIT EST AGE 40-64: CPT | Performed by: FAMILY MEDICINE

## 2022-09-19 SDOH — ECONOMIC STABILITY: FOOD INSECURITY: WITHIN THE PAST 12 MONTHS, THE FOOD YOU BOUGHT JUST DIDN'T LAST AND YOU DIDN'T HAVE MONEY TO GET MORE.: NEVER TRUE

## 2022-09-19 SDOH — ECONOMIC STABILITY: FOOD INSECURITY: WITHIN THE PAST 12 MONTHS, YOU WORRIED THAT YOUR FOOD WOULD RUN OUT BEFORE YOU GOT MONEY TO BUY MORE.: NEVER TRUE

## 2022-09-19 ASSESSMENT — PATIENT HEALTH QUESTIONNAIRE - PHQ9
SUM OF ALL RESPONSES TO PHQ QUESTIONS 1-9: 0
SUM OF ALL RESPONSES TO PHQ9 QUESTIONS 1 & 2: 0
SUM OF ALL RESPONSES TO PHQ QUESTIONS 1-9: 0
2. FEELING DOWN, DEPRESSED OR HOPELESS: 0
1. LITTLE INTEREST OR PLEASURE IN DOING THINGS: 0

## 2022-09-19 ASSESSMENT — SOCIAL DETERMINANTS OF HEALTH (SDOH): HOW HARD IS IT FOR YOU TO PAY FOR THE VERY BASICS LIKE FOOD, HOUSING, MEDICAL CARE, AND HEATING?: NOT HARD AT ALL

## 2022-09-19 NOTE — PROGRESS NOTES
[address] Note    Date: 9/19/2022                                               Evertt Blazing:     Chief Complaint   Patient presents with    Annual Exam       HPI  Patient is here for annual exam.  Last colonoscopy 2019, was told to follow-up in 10 years. Last Tdap 2017. Patient currently takes no medicines. Pt reports intermittent L sided chest pain starting about a few years ago. Lasts about 5-6 minutes when it comes, which is 1-2 times per month. No associated SOB, HA, or dizziness. Last episode was about a month ago. Patient reports a rash on bilateral cheeks as developed over the last year or 2. He is not itchy. Is not painful. Patient Active Problem List    Diagnosis Date Noted    Incisional hernia, without obstruction or gangrene 03/22/2022       Past Medical History:   Diagnosis Date    Hernia, abdominal        No current outpatient medications on file. No current facility-administered medications for this visit. No Known Allergies    Review of Systems  No CP, no SOB,  no bruise, no HA, no vision change, no ankle swelling, no hearing problems, no LAD      Vitals:  /70   Pulse 91   Ht 5' 6\" (1.676 m)   Wt 167 lb (75.8 kg)   SpO2 98%   BMI 26.95 kg/m²     Wt Readings from Last 3 Encounters:   09/19/22 167 lb (75.8 kg)   05/04/22 156 lb (70.8 kg)   04/26/22 158 lb (71.7 kg)        Physical Exam  General:  Well-appearing, NAD, alert, non-toxic  HEENT:  Normocephalic, atraumatic. Pupils equal and round. TMs pearly with good landmarks. Moist mucous membranes. Normal dentition  NECK:  Supple, normal range of motion, no LAD, no meningeal signs, no JVD, nontender  CHEST/LUNGS: CTAB, no crackles, no wheeze, no rhonchi. Symmetric rise  CARDIOVASCULAR: RRR,  no murmur, no rub  ABDOMEN: Soft, non-tender, non-distended. No masses  EXTREMETIES: Normal movement of all extremities. No edema. No joint swelling.   SKIN: + Hyperpigmented skin on bilateral cheeks without scale or ulceration, right greater than left. PSYCH:  A+O x 3; normal affect  NEURO:  GCS 15, CN2-12 grossly intact, no focal motor/sensory deficits, no cerebellar deficits, normal gait, normal speech      Assessment/Plan     72-year-old male here for annual exam.  Vital signs are stable. Check routine labs. Vaccines up-to-date. Overall is pretty healthy. Patient experiencing mild intermittent chest pain. His typical overall. Will check a stress test to rule out ACS. Patient has rash on bilateral cheeks. This of unclear etiology. Refer to dermatology for further evaluation and treatment. Discharged in stable condition at 17:11    1. Annual physical exam  2. Chest pain, unspecified type  -     Cardiac Stress Test Exercise Only - Clinic Performed; Future  3. Rash  -     EDMUND Peoples MD, [facility]     Orders Placed This Encounter   Procedures    EDMUND Peoples MD, [facility]     Referral Priority:   Routine     Referral Type:   Eval and Treat     Referral Reason:   Specialty Services Required     Referred to Provider:   Eunice Zaragoza     Requested Specialty:   Dermatology     Number of Visits Requested:   1    Cardiac Stress Test Exercise Only - Clinic Performed     Standing Status:   Future     Standing Expiration Date:   9/19/2023     Order Specific Question:   Reason for Exam?     Answer:   Chest pain         No follow-ups on file.     Tristin Flowers MD, MD    9/19/2022  5:29 PM

## 2022-09-20 DIAGNOSIS — Z00.00 ANNUAL PHYSICAL EXAM: ICD-10-CM

## 2022-09-20 LAB
A/G RATIO: 2 (ref 1.1–2.2)
ALBUMIN SERPL-MCNC: 4.5 G/DL (ref 3.4–5)
ALP BLD-CCNC: 84 U/L (ref 40–129)
ALT SERPL-CCNC: 21 U/L (ref 10–40)
ANION GAP SERPL CALCULATED.3IONS-SCNC: 13 MMOL/L (ref 3–16)
AST SERPL-CCNC: 22 U/L (ref 15–37)
BASOPHILS ABSOLUTE: 0 K/UL (ref 0–0.2)
BASOPHILS RELATIVE PERCENT: 0.7 %
BILIRUB SERPL-MCNC: 0.9 MG/DL (ref 0–1)
BUN BLDV-MCNC: 13 MG/DL (ref 7–20)
CALCIUM SERPL-MCNC: 9.2 MG/DL (ref 8.3–10.6)
CHLORIDE BLD-SCNC: 103 MMOL/L (ref 99–110)
CHOLESTEROL, FASTING: 184 MG/DL (ref 0–199)
CO2: 23 MMOL/L (ref 21–32)
CREAT SERPL-MCNC: 1.1 MG/DL (ref 0.9–1.3)
EOSINOPHILS ABSOLUTE: 0.2 K/UL (ref 0–0.6)
EOSINOPHILS RELATIVE PERCENT: 3.7 %
GFR AFRICAN AMERICAN: >60
GFR NON-AFRICAN AMERICAN: >60
GLUCOSE BLD-MCNC: 88 MG/DL (ref 70–99)
HCT VFR BLD CALC: 46.3 % (ref 40.5–52.5)
HDLC SERPL-MCNC: 38 MG/DL (ref 40–60)
HEMOGLOBIN: 15.6 G/DL (ref 13.5–17.5)
LDL CHOLESTEROL CALCULATED: 121 MG/DL
LYMPHOCYTES ABSOLUTE: 2.4 K/UL (ref 1–5.1)
LYMPHOCYTES RELATIVE PERCENT: 41.9 %
MCH RBC QN AUTO: 30 PG (ref 26–34)
MCHC RBC AUTO-ENTMCNC: 33.8 G/DL (ref 31–36)
MCV RBC AUTO: 88.8 FL (ref 80–100)
MONOCYTES ABSOLUTE: 0.5 K/UL (ref 0–1.3)
MONOCYTES RELATIVE PERCENT: 8.3 %
NEUTROPHILS ABSOLUTE: 2.6 K/UL (ref 1.7–7.7)
NEUTROPHILS RELATIVE PERCENT: 45.4 %
PDW BLD-RTO: 13.7 % (ref 12.4–15.4)
PLATELET # BLD: 243 K/UL (ref 135–450)
PMV BLD AUTO: 8.8 FL (ref 5–10.5)
POTASSIUM SERPL-SCNC: 4.8 MMOL/L (ref 3.5–5.1)
RBC # BLD: 5.21 M/UL (ref 4.2–5.9)
SODIUM BLD-SCNC: 139 MMOL/L (ref 136–145)
TOTAL PROTEIN: 6.8 G/DL (ref 6.4–8.2)
TRIGLYCERIDE, FASTING: 125 MG/DL (ref 0–150)
TSH REFLEX: 3.38 UIU/ML (ref 0.27–4.2)
VLDLC SERPL CALC-MCNC: 25 MG/DL
WBC # BLD: 5.6 K/UL (ref 4–11)

## 2022-09-21 LAB
ESTIMATED AVERAGE GLUCOSE: 96.8 MG/DL
HBA1C MFR BLD: 5 %

## 2022-09-26 ENCOUNTER — TELEPHONE (OUTPATIENT)
Dept: FAMILY MEDICINE CLINIC | Age: 57
End: 2022-09-26

## 2022-09-26 NOTE — TELEPHONE ENCOUNTER
Patient was seen on 09/19/2022    And gave a form for his physical for work and patient needs that form filled out    No form was in media     His email to send form to is     [email or web address]

## 2022-12-02 ENCOUNTER — OFFICE VISIT (OUTPATIENT)
Dept: SURGERY | Age: 57
End: 2022-12-02

## 2022-12-02 VITALS — WEIGHT: 166 LBS | DIASTOLIC BLOOD PRESSURE: 70 MMHG | SYSTOLIC BLOOD PRESSURE: 116 MMHG | BODY MASS INDEX: 26.79 KG/M2

## 2022-12-02 DIAGNOSIS — Z09 SURGERY FOLLOW-UP: Primary | ICD-10-CM

## 2022-12-02 PROCEDURE — 99024 POSTOP FOLLOW-UP VISIT: CPT | Performed by: SURGERY

## 2022-12-02 NOTE — PROGRESS NOTES
[facility] [facility] and Laparoscopic Surgery  SUBJECTIVE:    Bianca Dumont   1965   65 y.o. male presents for routine postoperative followup after robot-assisted laparoscopic repair of incarcerated incisional and reducible umbilical hernia with mesh on April 26, 2022. Still having incisional pain in the right lower quadrant that is interfering with his activity and exercise regimen of running several miles per day. Also notes small bump on right lower quadrant as well as skin pigmentation lesions on the abdomen causing concern.   Tolerated diet and bowels normal.  Ambulating otherwise well    Past Medical History:   Diagnosis Date    Hernia, abdominal      Past Surgical History:   Procedure Laterality Date    APPENDECTOMY      COLONOSCOPY N/A 2/8/2019    [address] Emil Garza MD [address] N/A 4/26/2022    ROBOT ASSISTED LAPAROSCOPIC INCISIONAL AND UMBILICAL HERNIA REPAIR WITH MESH ([id number]) performed by Kishor Patrick MD at [address] History     Socioeconomic History    Marital status:      Spouse name: Not on file    Number of children: Not on file    Years of education: Not on file    Highest education level: Not on file   Occupational History    Not on file   Tobacco Use    Smoking status: Never    Smokeless tobacco: Never   Vaping Use    Vaping Use: Never used   Substance and Sexual Activity    Alcohol use: No    Drug use: No    Sexual activity: Not on file   Other Topics Concern    Not on file   Social History Narrative    Not on file     Social Determinants of Health     Financial Resource Strain: Low Risk     Difficulty of Paying Living Expenses: Not hard at all   Food Insecurity: No Food Insecurity    Worried About Running Out of Food in the Last Year: Never true    Ran Out of Food in the Last Year: Never true   Transportation Needs: Not on file   Physical Activity: Not on file   Stress: Not on file   Social Connections: Not on file   Intimate Partner Violence: Not on file   Housing Stability: Not on file      Family History   Problem Relation Age of Onset    Diabetes Mother     Cancer Paternal Aunt      No current outpatient medications on file. No current facility-administered medications for this visit. No Known Allergies     Review of Systems:  Review of systems performed and negative with the exception of the above findings    OBJECTIVE:  /70   Wt 166 lb (75.3 kg)   BMI 26.79 kg/m²      Physical Exam:  General appearance: alert, appears stated age, cooperative, and no distress  Head: Normocephalic, without obvious abnormality, atraumatic  Lungs: clear to auscultation bilaterally  Heart: regular rate and rhythm, S1, S2 normal, no murmur, click, rub or gallop  Abdomen: soft, non-distended, appropriate incisional tenderness, incisions clean dry and intact, no visible or palpable evidence of hernia recurrence seroma abscess or complication    No visits with results within 6 Week(s) from this visit.    Latest known visit with results is:   Orders Only on 09/20/2022   Component Date Value Ref Range Status    TSH 09/20/2022 3.38  0.27 - 4.20 uIU/mL Final    Cholesterol, Fasting 09/20/2022 184  0 - 199 mg/dL Final    Triglyceride, Fasting 09/20/2022 125  0 - 150 mg/dL Final    HDL 09/20/2022 38 (A)  40 - 60 mg/dL Final    LDL Calculated 09/20/2022 121 (A)  <100 mg/dL Final    VLDL Cholesterol Calculated 09/20/2022 25  Not Established mg/dL Final    Hemoglobin A1C 09/20/2022 5.0  See comment % Final    Comment: Comment:  Diagnosis of Diabetes: > or = 6.5%  Increased risk of diabetes (Prediabetes): 5.7-6.4%  Glycemic Control: Nonpregnant Adults: <7.0%                    Pregnant: <6.0%        eAG 09/20/2022 96.8  mg/dL Final    Sodium 09/20/2022 139  136 - 145 mmol/L Final    Potassium 09/20/2022 4.8  3.5 - 5.1 mmol/L Final    Chloride 09/20/2022 103  99 - 110 mmol/L Final    CO2 09/20/2022 23  21 - 32 mmol/L Final    Anion Gap 09/20/2022 13  3 - 16 Final    Glucose 09/20/2022 88  70 - 99 mg/dL Final    BUN 09/20/2022 13  7 - 20 mg/dL Final    Creatinine 09/20/2022 1.1  0.9 - 1.3 mg/dL Final    GFR Non- 09/20/2022 >60  >60 Final    Comment: >60 mL/min/1.73m2 EGFR, calc. for ages 25 and older using the  MDRD formula (not corrected for weight), is valid for stable  renal function. GFR  09/20/2022 >60  >60 Final    Comment: Chronic Kidney Disease: less than 60 ml/min/1.73 sq.m. Kidney Failure: less than 15 ml/min/1.73 sq.m. Results valid for patients 18 years and older.       Calcium 09/20/2022 9.2  8.3 - 10.6 mg/dL Final    Total Protein 09/20/2022 6.8  6.4 - 8.2 g/dL Final    Albumin 09/20/2022 4.5  3.4 - 5.0 g/dL Final    Albumin/Globulin Ratio 09/20/2022 2.0  1.1 - 2.2 Final    Total Bilirubin 09/20/2022 0.9  0.0 - 1.0 mg/dL Final    Alkaline Phosphatase 09/20/2022 84  40 - 129 U/L Final    ALT 09/20/2022 21  10 - 40 U/L Final    AST 09/20/2022 22  15 - 37 U/L Final    WBC 09/20/2022 5.6  4.0 - 11.0 K/uL Final    RBC 09/20/2022 5.21  4.20 - 5.90 M/uL Final    Hemoglobin 09/20/2022 15.6  13.5 - 17.5 g/dL Final    Hematocrit 09/20/2022 46.3  40.5 - 52.5 % Final    MCV 09/20/2022 88.8  80.0 - 100.0 fL Final    MCH 09/20/2022 30.0  26.0 - 34.0 pg Final    MCHC 09/20/2022 33.8  31.0 - 36.0 g/dL Final    RDW 09/20/2022 13.7  12.4 - 15.4 % Final    Platelets 02/57/6842 243  135 - 450 K/uL Final    MPV 09/20/2022 8.8  5.0 - 10.5 fL Final    Neutrophils % 09/20/2022 45.4  % Final    Lymphocytes % 09/20/2022 41.9  % Final    Monocytes % 09/20/2022 8.3  % Final    Eosinophils % 09/20/2022 3.7  % Final    Basophils % 09/20/2022 0.7  % Final    Neutrophils Absolute 09/20/2022 2.6  1.7 - 7.7 K/uL Final    Lymphocytes Absolute 09/20/2022 2.4  1.0 - 5.1 K/uL Final    Monocytes Absolute 09/20/2022 0.5  0.0 - 1.3 K/uL Final    Eosinophils Absolute 09/20/2022 0.2  0.0 - 0.6 K/uL Final Basophils Absolute 09/20/2022 0.0  0.0 - 0.2 K/uL Final       No results found. Pathology:  none    Assessment:  robot-assisted laparoscopic repair of incarcerated incisional and reducible umbilical hernia with mesh on April 26, 2022    Plan: Will order CT of abdomen pelvis to rule out complications such as recurrence of seroma abscess or other etiology  Minimize strenuous activity for the next 2 weeks  Recommend scheduled Tylenol ibuprofen with food around-the-clock for the next 2 weeks  Return to office in 2 weeks after CT to discuss progress results and further options    Tano Pierce MD, FACS  12/2/2022  4:00 PM

## 2022-12-02 NOTE — PATIENT INSTRUCTIONS
Will order CT of abdomen pelvis to rule out complications such as recurrence of seroma abscess or other etiology  Minimize strenuous activity for the next 2 weeks  Recommend scheduled Tylenol ibuprofen with food around-the-clock for the next 2 weeks  Return to office in 2 weeks after CT to discuss progress results and further options

## 2022-12-14 ENCOUNTER — HOSPITAL ENCOUNTER (OUTPATIENT)
Dept: CT IMAGING | Age: 57
Discharge: HOME | End: 2022-12-14
Payer: COMMERCIAL

## 2022-12-14 DIAGNOSIS — Z09 SURGERY FOLLOW-UP: ICD-10-CM

## 2022-12-14 PROCEDURE — 74177 CT ABD & PELVIS W/CONTRAST: CPT

## 2022-12-14 PROCEDURE — A4641 RADIOPHARM DX AGENT NOC: HCPCS | Performed by: SURGERY

## 2022-12-14 PROCEDURE — 6360000004 HC RX CONTRAST MEDICATION: Performed by: SURGERY

## 2022-12-14 RX ADMIN — BARIUM SULFATE 900 ML: 21 SUSPENSION ORAL at 17:42

## 2022-12-14 RX ADMIN — IOPAMIDOL 75 ML: 755 INJECTION, SOLUTION INTRAVENOUS at 17:42

## 2023-09-22 ENCOUNTER — OFFICE VISIT (OUTPATIENT)
Dept: FAMILY MEDICINE CLINIC | Age: 58
End: 2023-09-22

## 2023-09-22 VITALS
HEART RATE: 64 BPM | TEMPERATURE: 97.2 F | HEIGHT: 66 IN | OXYGEN SATURATION: 98 % | SYSTOLIC BLOOD PRESSURE: 119 MMHG | RESPIRATION RATE: 16 BRPM | WEIGHT: 165 LBS | BODY MASS INDEX: 26.52 KG/M2 | DIASTOLIC BLOOD PRESSURE: 81 MMHG

## 2023-09-22 DIAGNOSIS — Z00.00 ANNUAL PHYSICAL EXAM: Primary | ICD-10-CM

## 2023-09-22 DIAGNOSIS — Z23 NEED FOR VACCINATION: ICD-10-CM

## 2023-09-22 DIAGNOSIS — L98.9 SKIN LESION OF FACE: ICD-10-CM

## 2023-09-22 DIAGNOSIS — Z00.00 ANNUAL PHYSICAL EXAM: ICD-10-CM

## 2023-09-22 LAB
ALBUMIN SERPL-MCNC: 4.4 G/DL (ref 3.4–5)
ALBUMIN/GLOB SERPL: 1.9 {RATIO} (ref 1.1–2.2)
ALP SERPL-CCNC: 79 U/L (ref 40–129)
ALT SERPL-CCNC: 21 U/L (ref 10–40)
ANION GAP SERPL CALCULATED.3IONS-SCNC: 10 MMOL/L (ref 3–16)
AST SERPL-CCNC: 19 U/L (ref 15–37)
BASOPHILS # BLD: 0 K/UL (ref 0–0.2)
BASOPHILS NFR BLD: 0.7 %
BILIRUB SERPL-MCNC: 0.9 MG/DL (ref 0–1)
BUN SERPL-MCNC: 11 MG/DL (ref 7–20)
CALCIUM SERPL-MCNC: 8.7 MG/DL (ref 8.3–10.6)
CHLORIDE SERPL-SCNC: 105 MMOL/L (ref 99–110)
CHOLEST SERPL-MCNC: 188 MG/DL (ref 0–199)
CO2 SERPL-SCNC: 23 MMOL/L (ref 21–32)
CREAT SERPL-MCNC: 1 MG/DL (ref 0.9–1.3)
DEPRECATED RDW RBC AUTO: 13.4 % (ref 12.4–15.4)
EOSINOPHIL # BLD: 0.2 K/UL (ref 0–0.6)
EOSINOPHIL NFR BLD: 4 %
GFR SERPLBLD CREATININE-BSD FMLA CKD-EPI: >60 ML/MIN/{1.73_M2}
GLUCOSE SERPL-MCNC: 95 MG/DL (ref 70–99)
HCT VFR BLD AUTO: 46.8 % (ref 40.5–52.5)
HDLC SERPL-MCNC: 37 MG/DL (ref 40–60)
HGB BLD-MCNC: 16.1 G/DL (ref 13.5–17.5)
LDL CHOLESTEROL CALCULATED: 122 MG/DL
LYMPHOCYTES # BLD: 2.3 K/UL (ref 1–5.1)
LYMPHOCYTES NFR BLD: 40.5 %
MCH RBC QN AUTO: 30.2 PG (ref 26–34)
MCHC RBC AUTO-ENTMCNC: 34.4 G/DL (ref 31–36)
MCV RBC AUTO: 87.8 FL (ref 80–100)
MONOCYTES # BLD: 0.4 K/UL (ref 0–1.3)
MONOCYTES NFR BLD: 7.4 %
NEUTROPHILS # BLD: 2.7 K/UL (ref 1.7–7.7)
NEUTROPHILS NFR BLD: 47.4 %
PLATELET # BLD AUTO: 226 K/UL (ref 135–450)
PMV BLD AUTO: 8.9 FL (ref 5–10.5)
POTASSIUM SERPL-SCNC: 4.4 MMOL/L (ref 3.5–5.1)
PROT SERPL-MCNC: 6.7 G/DL (ref 6.4–8.2)
RBC # BLD AUTO: 5.33 M/UL (ref 4.2–5.9)
SODIUM SERPL-SCNC: 138 MMOL/L (ref 136–145)
TRIGL SERPL-MCNC: 144 MG/DL (ref 0–150)
TSH SERPL DL<=0.005 MIU/L-ACNC: 3.37 UIU/ML (ref 0.27–4.2)
VLDLC SERPL CALC-MCNC: 29 MG/DL
WBC # BLD AUTO: 5.8 K/UL (ref 4–11)

## 2023-09-22 SDOH — ECONOMIC STABILITY: FOOD INSECURITY: WITHIN THE PAST 12 MONTHS, YOU WORRIED THAT YOUR FOOD WOULD RUN OUT BEFORE YOU GOT MONEY TO BUY MORE.: NEVER TRUE

## 2023-09-22 SDOH — ECONOMIC STABILITY: INCOME INSECURITY: HOW HARD IS IT FOR YOU TO PAY FOR THE VERY BASICS LIKE FOOD, HOUSING, MEDICAL CARE, AND HEATING?: NOT HARD AT ALL

## 2023-09-22 SDOH — ECONOMIC STABILITY: HOUSING INSECURITY
IN THE LAST 12 MONTHS, WAS THERE A TIME WHEN YOU DID NOT HAVE A STEADY PLACE TO SLEEP OR SLEPT IN A SHELTER (INCLUDING NOW)?: NO

## 2023-09-22 SDOH — ECONOMIC STABILITY: FOOD INSECURITY: WITHIN THE PAST 12 MONTHS, THE FOOD YOU BOUGHT JUST DIDN'T LAST AND YOU DIDN'T HAVE MONEY TO GET MORE.: NEVER TRUE

## 2023-09-22 ASSESSMENT — PATIENT HEALTH QUESTIONNAIRE - PHQ9
1. LITTLE INTEREST OR PLEASURE IN DOING THINGS: 0
2. FEELING DOWN, DEPRESSED OR HOPELESS: 0
SUM OF ALL RESPONSES TO PHQ QUESTIONS 1-9: 0
SUM OF ALL RESPONSES TO PHQ9 QUESTIONS 1 & 2: 0

## 2023-09-26 ENCOUNTER — TELEPHONE (OUTPATIENT)
Dept: FAMILY MEDICINE CLINIC | Age: 58
End: 2023-09-26

## 2023-09-26 NOTE — TELEPHONE ENCOUNTER
Patient called and he was seeing if his wellness form was completed and signed and dated   He also needs his labs and the form emailed to him     [email or web address]     He also needs a phone call that everything is completed

## 2024-04-23 ENCOUNTER — OFFICE VISIT (OUTPATIENT)
Dept: FAMILY MEDICINE CLINIC | Age: 59
End: 2024-04-23
Payer: COMMERCIAL

## 2024-04-23 VITALS
BODY MASS INDEX: 26.2 KG/M2 | WEIGHT: 163 LBS | HEART RATE: 77 BPM | SYSTOLIC BLOOD PRESSURE: 110 MMHG | TEMPERATURE: 98.1 F | OXYGEN SATURATION: 97 % | DIASTOLIC BLOOD PRESSURE: 70 MMHG | HEIGHT: 66 IN

## 2024-04-23 DIAGNOSIS — J20.9 ACUTE BRONCHITIS, UNSPECIFIED ORGANISM: Primary | ICD-10-CM

## 2024-04-23 DIAGNOSIS — R05.1 ACUTE COUGH: ICD-10-CM

## 2024-04-23 PROCEDURE — 99213 OFFICE O/P EST LOW 20 MIN: CPT | Performed by: FAMILY MEDICINE

## 2024-04-23 RX ORDER — DOXYCYCLINE HYCLATE 100 MG
100 TABLET ORAL 2 TIMES DAILY
Qty: 20 TABLET | Refills: 0 | Status: SHIPPED | OUTPATIENT
Start: 2024-04-23 | End: 2024-05-03

## 2024-04-23 RX ORDER — BENZONATATE 100 MG/1
100 CAPSULE ORAL 3 TIMES DAILY PRN
Qty: 15 CAPSULE | Refills: 0 | Status: SHIPPED | OUTPATIENT
Start: 2024-04-23 | End: 2024-04-28

## 2024-04-23 ASSESSMENT — PATIENT HEALTH QUESTIONNAIRE - PHQ9
2. FEELING DOWN, DEPRESSED OR HOPELESS: NOT AT ALL
SUM OF ALL RESPONSES TO PHQ QUESTIONS 1-9: 0
1. LITTLE INTEREST OR PLEASURE IN DOING THINGS: NOT AT ALL
SUM OF ALL RESPONSES TO PHQ QUESTIONS 1-9: 0
SUM OF ALL RESPONSES TO PHQ9 QUESTIONS 1 & 2: 0

## 2024-04-23 NOTE — PROGRESS NOTES
[facility] Medicine  Clinic Note    Date: 4/23/2024                                               /Objective:     Chief Complaint   Patient presents with    Cough       HPI  Cough x10 days. Pt recently traveled to [facility] and [place], [place]. +fatigue. +subjective fever, measured up to 99. Cough is productive of sputum. Overall is getting a little better.         Patient Active Problem List    Diagnosis Date Noted    Incisional hernia, without obstruction or gangrene 03/22/2022       Past Medical History:   Diagnosis Date    Hernia, abdominal        Current Outpatient Medications   Medication Sig Dispense Refill    doxycycline hyclate (VIBRA-TABS) 100 MG tablet Take 1 tablet by mouth 2 times daily for 10 days 20 tablet 0    benzonatate (TESSALON PERLES) 100 MG capsule Take 1 capsule by mouth 3 times daily as needed for Cough 15 capsule 0     No current facility-administered medications for this visit.       No Known Allergies    Review of Systems  No SOB, no vomiting, no wheeze    Vitals:  /70   Pulse 77   Ht 1.676 m (5' 6\")   Wt 73.9 kg (163 lb)   SpO2 97%   BMI 26.31 kg/m²     Wt Readings from Last 3 Encounters:   04/23/24 73.9 kg (163 lb)   09/22/23 74.8 kg (165 lb)   12/02/22 75.3 kg (166 lb)        Physical Exam  General:  +acutely ill, NAD, alert, non-toxic  HEENT:  Normocephalic, atraumatic. Pupils equal and round. Moist mucous membranes. Normal dentition. No pharyngeal erythema, no exudates  NECK:  Supple, normal range of motion, no LAD, no meningeal signs  CHEST/LUNGS: CTAB, no crackles, no wheeze, no rhonchi. Symmetric rise  CARDIOVASCULAR: RRR,  no murmur, no rub, pulses 2+  EXTREMETIES: Normal movement of all extremities. No edema. No joint swelling.  NEURO:  GCS 15, CN2-12 grossly intact, no focal motor/sensory deficits, no cerebellar deficits, normal gait, normal speech      Assessment/Plan     59 y.o.yo male with acute bronchitis. Will treat with doxycycline. Tessalon as needed

## 2024-09-23 ENCOUNTER — OFFICE VISIT (OUTPATIENT)
Dept: FAMILY MEDICINE CLINIC | Age: 59
End: 2024-09-23

## 2024-09-23 VITALS
HEART RATE: 58 BPM | WEIGHT: 168 LBS | SYSTOLIC BLOOD PRESSURE: 112 MMHG | BODY MASS INDEX: 27 KG/M2 | OXYGEN SATURATION: 98 % | TEMPERATURE: 97.6 F | HEIGHT: 66 IN | DIASTOLIC BLOOD PRESSURE: 80 MMHG

## 2024-09-23 DIAGNOSIS — Z00.00 ANNUAL PHYSICAL EXAM: Primary | ICD-10-CM

## 2024-09-23 DIAGNOSIS — Z00.00 ANNUAL PHYSICAL EXAM: ICD-10-CM

## 2024-09-23 SDOH — ECONOMIC STABILITY: FOOD INSECURITY: WITHIN THE PAST 12 MONTHS, YOU WORRIED THAT YOUR FOOD WOULD RUN OUT BEFORE YOU GOT MONEY TO BUY MORE.: NEVER TRUE

## 2024-09-23 SDOH — ECONOMIC STABILITY: FOOD INSECURITY: WITHIN THE PAST 12 MONTHS, THE FOOD YOU BOUGHT JUST DIDN'T LAST AND YOU DIDN'T HAVE MONEY TO GET MORE.: NEVER TRUE

## 2024-09-23 SDOH — ECONOMIC STABILITY: INCOME INSECURITY: HOW HARD IS IT FOR YOU TO PAY FOR THE VERY BASICS LIKE FOOD, HOUSING, MEDICAL CARE, AND HEATING?: NOT HARD AT ALL

## 2024-09-23 ASSESSMENT — PATIENT HEALTH QUESTIONNAIRE - PHQ9
1. LITTLE INTEREST OR PLEASURE IN DOING THINGS: SEVERAL DAYS
SUM OF ALL RESPONSES TO PHQ QUESTIONS 1-9: 1
2. FEELING DOWN, DEPRESSED OR HOPELESS: NOT AT ALL
SUM OF ALL RESPONSES TO PHQ9 QUESTIONS 1 & 2: 1
SUM OF ALL RESPONSES TO PHQ QUESTIONS 1-9: 1

## 2024-09-24 LAB
ALBUMIN SERPL-MCNC: 4.2 G/DL (ref 3.4–5)
ALBUMIN/GLOB SERPL: 1.9 {RATIO} (ref 1.1–2.2)
ALP SERPL-CCNC: 70 U/L (ref 40–129)
ALT SERPL-CCNC: 20 U/L (ref 10–40)
ANION GAP SERPL CALCULATED.3IONS-SCNC: 10 MMOL/L (ref 3–16)
AST SERPL-CCNC: 23 U/L (ref 15–37)
BASOPHILS # BLD: 0.1 K/UL (ref 0–0.2)
BASOPHILS NFR BLD: 1 %
BILIRUB SERPL-MCNC: 0.8 MG/DL (ref 0–1)
BUN SERPL-MCNC: 11 MG/DL (ref 7–20)
CALCIUM SERPL-MCNC: 9 MG/DL (ref 8.3–10.6)
CHLORIDE SERPL-SCNC: 105 MMOL/L (ref 99–110)
CHOLEST SERPL-MCNC: 182 MG/DL (ref 0–199)
CO2 SERPL-SCNC: 24 MMOL/L (ref 21–32)
CREAT SERPL-MCNC: 1 MG/DL (ref 0.9–1.3)
DEPRECATED RDW RBC AUTO: 13.8 % (ref 12.4–15.4)
EOSINOPHIL # BLD: 0.3 K/UL (ref 0–0.6)
EOSINOPHIL NFR BLD: 5 %
EST. AVERAGE GLUCOSE BLD GHB EST-MCNC: 93.9 MG/DL
GFR SERPLBLD CREATININE-BSD FMLA CKD-EPI: 87 ML/MIN/{1.73_M2}
GLUCOSE SERPL-MCNC: 77 MG/DL (ref 70–99)
HBA1C MFR BLD: 4.9 %
HCT VFR BLD AUTO: 46.2 % (ref 40.5–52.5)
HDLC SERPL-MCNC: 33 MG/DL (ref 40–60)
HGB BLD-MCNC: 15.5 G/DL (ref 13.5–17.5)
LDL CHOLESTEROL: 122 MG/DL
LYMPHOCYTES # BLD: 2.5 K/UL (ref 1–5.1)
LYMPHOCYTES NFR BLD: 45.2 %
MCH RBC QN AUTO: 29.5 PG (ref 26–34)
MCHC RBC AUTO-ENTMCNC: 33.4 G/DL (ref 31–36)
MCV RBC AUTO: 88.2 FL (ref 80–100)
MONOCYTES # BLD: 0.4 K/UL (ref 0–1.3)
MONOCYTES NFR BLD: 7.1 %
NEUTROPHILS # BLD: 2.3 K/UL (ref 1.7–7.7)
NEUTROPHILS NFR BLD: 41.7 %
PLATELET # BLD AUTO: 235 K/UL (ref 135–450)
PMV BLD AUTO: 8.9 FL (ref 5–10.5)
POTASSIUM SERPL-SCNC: 4.4 MMOL/L (ref 3.5–5.1)
PROT SERPL-MCNC: 6.4 G/DL (ref 6.4–8.2)
RBC # BLD AUTO: 5.24 M/UL (ref 4.2–5.9)
SODIUM SERPL-SCNC: 139 MMOL/L (ref 136–145)
TRIGL SERPL-MCNC: 135 MG/DL (ref 0–150)
VLDLC SERPL CALC-MCNC: 27 MG/DL
WBC # BLD AUTO: 5.5 K/UL (ref 4–11)

## (undated) DEVICE — BINDER ABD 4 PANEL LG 12 IN 46-62 IN UNISX LINING ELASTIC

## (undated) DEVICE — STERILE POLYISOPRENE POWDER-FREE SURGICAL GLOVES: Brand: PROTEXIS

## (undated) DEVICE — BLANKET WRM W29.9XL79.1IN UP BODY FORC AIR MISTRAL-AIR

## (undated) DEVICE — 60 ML SYRINGE,REGULAR TIP: Brand: MONOJECT

## (undated) DEVICE — LIGHT HANDLE: Brand: DEVON

## (undated) DEVICE — SUTURE 0 STRATAFIX SYMMETRIC PDS + 23CM CT-2 SXPP1A446

## (undated) DEVICE — MARKER,SKIN,WI/RULER AND LABELS: Brand: MEDLINE

## (undated) DEVICE — GOWN AURORA NONREINF XXL: Brand: MEDLINE INDUSTRIES, INC.

## (undated) DEVICE — SUTURE VCRL + SZ 3-0 L27IN ABSRB UD L26MM SH 1/2 CIR VCP416H

## (undated) DEVICE — TUBING INSUFFLATION 10FT HIGH FLOW LEUR

## (undated) DEVICE — APPLICATOR MEDICATED 26 CC SOLUTION HI LT ORNG CHLORAPREP

## (undated) DEVICE — MERCY FAIRFIELD TURNOVER KIT: Brand: MEDLINE INDUSTRIES, INC.

## (undated) DEVICE — BW-412T DISP COMBO CLEANING BRUSH: Brand: SINGLE USE COMBINATION CLEANING BRUSH

## (undated) DEVICE — SUTURE MCRYL + SZ 4-0 L27IN ABSRB UD L19MM PS-2 3/8 CIR MCP426H

## (undated) DEVICE — 30977 SEE SHARP - ENHANCED INTRAOPERATIVE LAPAROSCOPE CLEANING & DEFOGGING: Brand: 30977 SEE SHARP - ENHANCED INTRAOPERATIVE LAPAROSCOPE CLEANING & DEFOGGING

## (undated) DEVICE — PROCEDURE KIT ENDOSCP CUST

## (undated) DEVICE — BLADELESS OBTURATOR: Brand: WECK VISTA

## (undated) DEVICE — GOWN AURORA NONREINF LG: Brand: MEDLINE INDUSTRIES, INC.

## (undated) DEVICE — TROCAR: Brand: KII FIOS FIRST ENTRY

## (undated) DEVICE — SUTURE V-LOC 180 SZ 2-0 L9IN ABSRB GRN L27MM GS-22 1/2 CIR VLOCL2145

## (undated) DEVICE — CANNULA SEAL

## (undated) DEVICE — SET VLV 3 PC AWS DISPOSABLE GRDIAN SCOPEVALET

## (undated) DEVICE — SOLUTION IV IRRIG WATER 500ML POUR BRL ST 2F7113

## (undated) DEVICE — PMI DISPOSABLE PUNCTURE CLOSURE DEVICE / SUTURE GRASPER: Brand: PMI

## (undated) DEVICE — ROBOTIC PK

## (undated) DEVICE — ELECTRODE PT RET AD L9FT HI MOIST COND ADH HYDRGEL CORDED

## (undated) DEVICE — ARM DRAPE

## (undated) DEVICE — HYPODERMIC SAFETY NEEDLE: Brand: MAGELLAN